# Patient Record
Sex: FEMALE | Race: BLACK OR AFRICAN AMERICAN | NOT HISPANIC OR LATINO | URBAN - METROPOLITAN AREA
[De-identification: names, ages, dates, MRNs, and addresses within clinical notes are randomized per-mention and may not be internally consistent; named-entity substitution may affect disease eponyms.]

---

## 2021-11-02 ENCOUNTER — TRANSCRIBE ORDERS (OUTPATIENT)
Dept: URGENT CARE | Facility: CLINIC | Age: 41
End: 2021-11-02

## 2021-11-02 ENCOUNTER — CLINICAL SUPPORT (OUTPATIENT)
Dept: URGENT CARE | Facility: CLINIC | Age: 41
End: 2021-11-02

## 2021-11-02 DIAGNOSIS — Z02.1 PHYSICAL EXAM, PRE-EMPLOYMENT: Primary | ICD-10-CM

## 2021-11-02 DIAGNOSIS — Z02.1 ENCOUNTER FOR PRE-EMPLOYMENT EXAMINATION: ICD-10-CM

## 2021-11-02 PROCEDURE — 99203 OFFICE O/P NEW LOW 30 MIN: CPT | Performed by: PHYSICIAN ASSISTANT

## 2021-11-02 PROCEDURE — 86787 VARICELLA-ZOSTER ANTIBODY: CPT | Performed by: PHYSICIAN ASSISTANT

## 2021-11-02 PROCEDURE — 86480 TB TEST CELL IMMUN MEASURE: CPT | Performed by: PHYSICIAN ASSISTANT

## 2021-11-02 PROCEDURE — 86762 RUBELLA ANTIBODY: CPT | Performed by: PHYSICIAN ASSISTANT

## 2021-11-02 PROCEDURE — 86735 MUMPS ANTIBODY: CPT | Performed by: PHYSICIAN ASSISTANT

## 2021-11-02 PROCEDURE — 86765 RUBEOLA ANTIBODY: CPT | Performed by: PHYSICIAN ASSISTANT

## 2021-11-03 LAB — RUBV IGG SERPL IA-ACNC: 60 IU/ML

## 2021-11-04 LAB
MEV IGG SER QL: NORMAL
MUV IGG SER QL: NORMAL
VZV IGG SER IA-ACNC: NORMAL

## 2021-11-05 LAB
GAMMA INTERFERON BACKGROUND BLD IA-ACNC: 0.06 IU/ML
M TB IFN-G BLD-IMP: NEGATIVE
M TB IFN-G CD4+ BCKGRND COR BLD-ACNC: -0.01 IU/ML
M TB IFN-G CD4+ BCKGRND COR BLD-ACNC: 0 IU/ML
MITOGEN IGNF BCKGRD COR BLD-ACNC: >10 IU/ML

## 2021-12-16 DIAGNOSIS — B34.9 VIRAL INFECTION: ICD-10-CM

## 2021-12-16 DIAGNOSIS — Z20.822 CLOSE EXPOSURE TO COVID-19 VIRUS: Primary | ICD-10-CM

## 2021-12-16 LAB
SARS-COV-2 AG UPPER RESP QL IA: NEGATIVE
VALID CONTROL: NORMAL

## 2021-12-16 PROCEDURE — 87811 SARS-COV-2 COVID19 W/OPTIC: CPT | Performed by: FAMILY MEDICINE

## 2022-03-15 ENCOUNTER — OFFICE VISIT (OUTPATIENT)
Dept: FAMILY MEDICINE CLINIC | Facility: CLINIC | Age: 42
End: 2022-03-15
Payer: COMMERCIAL

## 2022-03-15 VITALS
RESPIRATION RATE: 18 BRPM | DIASTOLIC BLOOD PRESSURE: 84 MMHG | TEMPERATURE: 98 F | WEIGHT: 160.6 LBS | SYSTOLIC BLOOD PRESSURE: 120 MMHG | HEIGHT: 64 IN | OXYGEN SATURATION: 95 % | HEART RATE: 86 BPM | BODY MASS INDEX: 27.42 KG/M2

## 2022-03-15 DIAGNOSIS — Z13.0 SCREENING, DEFICIENCY ANEMIA, IRON: ICD-10-CM

## 2022-03-15 DIAGNOSIS — Z00.00 HEALTH CARE MAINTENANCE: Primary | ICD-10-CM

## 2022-03-15 DIAGNOSIS — E55.9 VITAMIN D DEFICIENCY: ICD-10-CM

## 2022-03-15 DIAGNOSIS — R53.82 CHRONIC FATIGUE: ICD-10-CM

## 2022-03-15 DIAGNOSIS — Z13.1 SCREENING FOR DIABETES MELLITUS: ICD-10-CM

## 2022-03-15 DIAGNOSIS — Z76.89 ENCOUNTER TO ESTABLISH CARE: ICD-10-CM

## 2022-03-15 DIAGNOSIS — Z13.220 LIPID SCREENING: ICD-10-CM

## 2022-03-15 DIAGNOSIS — Z12.31 ENCOUNTER FOR SCREENING MAMMOGRAM FOR MALIGNANT NEOPLASM OF BREAST: ICD-10-CM

## 2022-03-15 DIAGNOSIS — Z13.29 SCREENING FOR THYROID DISORDER: ICD-10-CM

## 2022-03-15 PROCEDURE — 99386 PREV VISIT NEW AGE 40-64: CPT | Performed by: NURSE PRACTITIONER

## 2022-03-15 RX ORDER — VALACYCLOVIR HYDROCHLORIDE 500 MG/1
500 TABLET, FILM COATED ORAL 2 TIMES DAILY
COMMUNITY

## 2022-03-15 NOTE — PROGRESS NOTES
FAMILY PRACTICE HEALTH MAINTENANCE OFFICE VISIT  [facility] - [facility]    NAME: Tri Chavez  AGE: 35 y o  SEX: female  : 1980     DATE: 3/15/2022    Assessment and Plan     1  Encounter to establish care    2  Encounter for screening mammogram for malignant neoplasm of breast  -     Mammo screening bilateral w 3d & cad; Future; Expected date: 09/15/2022    3  Chronic fatigue  -     CBC and differential; Future  -     Vitamin D 25 hydroxy; Future  -     TSH, 3rd generation with Free T4 reflex; Future  -     Comprehensive metabolic panel; Future    4  Screening, deficiency anemia, iron  -     CBC and differential; Future    5  Screening for diabetes mellitus  -     Comprehensive metabolic panel; Future  -     Hemoglobin A1C; Future    6  Screening for thyroid disorder  -     TSH, 3rd generation with Free T4 reflex; Future    7  Lipid screening  -     Lipid panel; Future    8  Vitamin D deficiency  -     Vitamin D 25 hydroxy; Future        · Patient Counseling:   · Nutrition: Stressed importance of a well balanced diet, moderation of sodium/saturated fat, caloric balance and sufficient intake of fiber  · Exercise: Stressed the importance of regular exercise with a goal of 150 minutes per week  · Dental Health: Discussed daily flossing and brushing and regular dental visits   · Alcohol Use:  Recommended moderation of alcohol intake  · Injury Prevention: Discussed Safety Belts, Safety Helmets, and Smoke Detectors    · Immunizations reviewed: Up To Date  · Discussed benefits of:  Mammogram  and Screening labs   BMI Counseling: Body mass index is 27 57 kg/m²  Discussed with patient's BMI with her  The BMI is above normal  Nutrition recommendations include reducing portion sizes, 3-5 servings of fruits/vegetables daily, moderation in carbohydrate intake and increasing intake of lean protein  Exercise recommendations include exercising 3-5 times per week      No follow-ups on file         Chief Complaint     Chief Complaint   Patient presents with    Establish Care       History of Present Illness     Pt presents to establish care  Reviewed medical and family history    Mom and dad +heart disease, DM  Several siblings have DM    Patient in generally good health  H/o hysterectomy  heavy bleeding, fibroids  +IBS      Well Adult Physical   Patient here for a comprehensive physical exam       Diet and Physical Activity  Diet: well balanced diet  Exercise: occasionally      Depression Screen  PHQ-2/9 Depression Screening    Little interest or pleasure in doing things: 0 - not at all  Feeling down, depressed, or hopeless: 0 - not at all  PHQ-2 Score: 0  PHQ-2 Interpretation: Negative depression screen          General Health  Hearing: Normal:  bilateral  Vision: no vision problems  Dental: regular dental visits    Reproductive Health  No issues       The following portions of the patient's history were reviewed and updated as appropriate: allergies, current medications, past family history, past medical history, past social history, past surgical history and problem list     Review of Systems     Review of Systems   Constitutional: Positive for fatigue  Respiratory: Negative  Cardiovascular: Negative  Gastrointestinal:        IBS with diarrhea, constipation   Neurological: Negative  All other systems reviewed and are negative        Past Medical History     Past Medical History:   Diagnosis Date    Hypertension        Past Surgical History     Past Surgical History:   Procedure Laterality Date     SECTION      HYSTERECTOMY         Social History     Social History     Socioeconomic History    Marital status: Single     Spouse name: None    Number of children: None    Years of education: None    Highest education level: None   Occupational History    None   Tobacco Use    Smoking status: Former Smoker    Smokeless tobacco: Never Used   Substance and Sexual Activity    Alcohol use: Yes    Drug use: Never    Sexual activity: None   Other Topics Concern    None   Social History Narrative    None     Social Determinants of Health     Financial Resource Strain: Not on file   Food Insecurity: Not on file   Transportation Needs: Not on file   Physical Activity: Not on file   Stress: Not on file   Social Connections: Not on file   Intimate Partner Violence: Not on file   Housing Stability: Not on file       Family History     Family History   Problem Relation Age of Onset    Diabetes Mother     Cancer Mother     Hypertension Mother     Diabetes Father     Hypertension Father     Breast cancer Sister     Diabetes Brother     Hypertension Maternal Grandmother     Cancer Maternal Grandfather     Diabetes Paternal Grandmother     Diabetes Paternal Grandfather     Breast cancer Maternal Aunt        Current Medications       Current Outpatient Medications:     valACYclovir (VALTREX) 500 mg tablet, Take 500 mg by mouth 2 (two) times a day, Disp: , Rfl:      Allergies     Allergies   Allergen Reactions    Penicillin G Hives       Objective     /84 (BP Location: Left arm, Patient Position: Sitting, Cuff Size: Large)   Pulse 86   Temp 98 °F (36 7 °C)   Resp 18   Ht 5' 4" (1 626 m)   Wt 72 8 kg (160 lb 9 6 oz)   SpO2 95%   BMI 27 57 kg/m²      Physical Exam  Vitals and nursing note reviewed  Constitutional:       General: She is not in acute distress  Appearance: Normal appearance  She is well-developed  She is not ill-appearing  HENT:      Head: Normocephalic and atraumatic  Right Ear: Tympanic membrane normal       Left Ear: Tympanic membrane normal    Eyes:      General: Lids are normal  Lids are everted, no foreign bodies appreciated  Conjunctiva/sclera: Conjunctivae normal       Pupils: Pupils are equal, round, and reactive to light  Neck:      Thyroid: No thyroid mass or thyromegaly  Vascular: No carotid bruit or JVD  Cardiovascular:      Rate and Rhythm: Normal rate and regular rhythm  Pulses: Normal pulses  Heart sounds: Normal heart sounds  No murmur heard  Pulmonary:      Effort: Pulmonary effort is normal       Breath sounds: Normal breath sounds  Abdominal:      General: Bowel sounds are normal       Palpations: Abdomen is soft  There is no hepatomegaly or splenomegaly  Tenderness: There is no abdominal tenderness  Musculoskeletal:         General: No deformity  Right lower leg: No edema  Left lower leg: No edema  Lymphadenopathy:      Cervical: No cervical adenopathy  Skin:     General: Skin is warm and dry  Coloration: Skin is not pale  Neurological:      General: No focal deficit present  Mental Status: She is alert  Mental status is at baseline     Psychiatric:         Mood and Affect: Mood normal          Behavior: Behavior normal            No exam data present        Taryn Mask, [address]

## 2022-03-26 ENCOUNTER — APPOINTMENT (OUTPATIENT)
Dept: LAB | Facility: HOSPITAL | Age: 42
End: 2022-03-26
Payer: COMMERCIAL

## 2022-03-26 DIAGNOSIS — Z13.220 LIPID SCREENING: ICD-10-CM

## 2022-03-26 DIAGNOSIS — Z13.1 SCREENING FOR DIABETES MELLITUS: ICD-10-CM

## 2022-03-26 DIAGNOSIS — Z13.29 SCREENING FOR THYROID DISORDER: ICD-10-CM

## 2022-03-26 DIAGNOSIS — R53.82 CHRONIC FATIGUE: ICD-10-CM

## 2022-03-26 DIAGNOSIS — E55.9 VITAMIN D DEFICIENCY: ICD-10-CM

## 2022-03-26 DIAGNOSIS — Z13.0 SCREENING, DEFICIENCY ANEMIA, IRON: ICD-10-CM

## 2022-03-26 LAB
25(OH)D3 SERPL-MCNC: 12.1 NG/ML (ref 30–100)
ALBUMIN SERPL BCP-MCNC: 3.9 G/DL (ref 3.5–5)
ALP SERPL-CCNC: 72 U/L (ref 46–116)
ALT SERPL W P-5'-P-CCNC: 59 U/L (ref 12–78)
ANION GAP SERPL CALCULATED.3IONS-SCNC: 12 MMOL/L (ref 4–13)
AST SERPL W P-5'-P-CCNC: 24 U/L (ref 5–45)
BASOPHILS # BLD AUTO: 0.07 THOUSANDS/ΜL (ref 0–0.1)
BASOPHILS NFR BLD AUTO: 1 % (ref 0–1)
BILIRUB SERPL-MCNC: 0.23 MG/DL (ref 0.2–1)
BUN SERPL-MCNC: 16 MG/DL (ref 5–25)
CALCIUM SERPL-MCNC: 8.7 MG/DL (ref 8.3–10.1)
CHLORIDE SERPL-SCNC: 103 MMOL/L (ref 100–108)
CHOLEST SERPL-MCNC: 222 MG/DL
CO2 SERPL-SCNC: 27 MMOL/L (ref 21–32)
CREAT SERPL-MCNC: 0.75 MG/DL (ref 0.6–1.3)
EOSINOPHIL # BLD AUTO: 0.16 THOUSAND/ΜL (ref 0–0.61)
EOSINOPHIL NFR BLD AUTO: 3 % (ref 0–6)
ERYTHROCYTE [DISTWIDTH] IN BLOOD BY AUTOMATED COUNT: 13.9 % (ref 11.6–15.1)
EST. AVERAGE GLUCOSE BLD GHB EST-MCNC: 131 MG/DL
GFR SERPL CREATININE-BSD FRML MDRD: 97 ML/MIN/1.73SQ M
GLUCOSE P FAST SERPL-MCNC: 101 MG/DL (ref 65–99)
HBA1C MFR BLD: 6.2 %
HCT VFR BLD AUTO: 39.6 % (ref 34.8–46.1)
HDLC SERPL-MCNC: 80 MG/DL
HGB BLD-MCNC: 11.7 G/DL (ref 11.5–15.4)
IMM GRANULOCYTES # BLD AUTO: 0.01 THOUSAND/UL (ref 0–0.2)
IMM GRANULOCYTES NFR BLD AUTO: 0 % (ref 0–2)
LDLC SERPL CALC-MCNC: 124 MG/DL (ref 0–100)
LYMPHOCYTES # BLD AUTO: 2.07 THOUSANDS/ΜL (ref 0.6–4.47)
LYMPHOCYTES NFR BLD AUTO: 37 % (ref 14–44)
MCH RBC QN AUTO: 23 PG (ref 26.8–34.3)
MCHC RBC AUTO-ENTMCNC: 29.5 G/DL (ref 31.4–37.4)
MCV RBC AUTO: 78 FL (ref 82–98)
MONOCYTES # BLD AUTO: 0.44 THOUSAND/ΜL (ref 0.17–1.22)
MONOCYTES NFR BLD AUTO: 8 % (ref 4–12)
NEUTROPHILS # BLD AUTO: 2.82 THOUSANDS/ΜL (ref 1.85–7.62)
NEUTS SEG NFR BLD AUTO: 51 % (ref 43–75)
NONHDLC SERPL-MCNC: 142 MG/DL
NRBC BLD AUTO-RTO: 0 /100 WBCS
PLATELET # BLD AUTO: 318 THOUSANDS/UL (ref 149–390)
PMV BLD AUTO: 9.6 FL (ref 8.9–12.7)
POTASSIUM SERPL-SCNC: 3.8 MMOL/L (ref 3.5–5.3)
PROT SERPL-MCNC: 7.6 G/DL (ref 6.4–8.2)
RBC # BLD AUTO: 5.08 MILLION/UL (ref 3.81–5.12)
SODIUM SERPL-SCNC: 142 MMOL/L (ref 136–145)
TRIGL SERPL-MCNC: 92 MG/DL
TSH SERPL DL<=0.05 MIU/L-ACNC: 2.15 UIU/ML (ref 0.36–3.74)
WBC # BLD AUTO: 5.57 THOUSAND/UL (ref 4.31–10.16)

## 2022-03-26 PROCEDURE — 83036 HEMOGLOBIN GLYCOSYLATED A1C: CPT

## 2022-03-26 PROCEDURE — 36415 COLL VENOUS BLD VENIPUNCTURE: CPT

## 2022-03-26 PROCEDURE — 82306 VITAMIN D 25 HYDROXY: CPT

## 2022-03-26 PROCEDURE — 80053 COMPREHEN METABOLIC PANEL: CPT

## 2022-03-26 PROCEDURE — 80061 LIPID PANEL: CPT

## 2022-03-26 PROCEDURE — 85025 COMPLETE CBC W/AUTO DIFF WBC: CPT

## 2022-03-26 PROCEDURE — 84443 ASSAY THYROID STIM HORMONE: CPT

## 2022-04-12 ENCOUNTER — TELEMEDICINE (OUTPATIENT)
Dept: FAMILY MEDICINE CLINIC | Facility: CLINIC | Age: 42
End: 2022-04-12
Payer: COMMERCIAL

## 2022-04-12 DIAGNOSIS — E55.9 VITAMIN D DEFICIENCY: ICD-10-CM

## 2022-04-12 DIAGNOSIS — R53.82 CHRONIC FATIGUE: ICD-10-CM

## 2022-04-12 DIAGNOSIS — R73.03 PRE-DIABETES: Primary | ICD-10-CM

## 2022-04-12 DIAGNOSIS — Z71.2 ENCOUNTER TO DISCUSS TEST RESULTS: ICD-10-CM

## 2022-04-12 PROCEDURE — 99213 OFFICE O/P EST LOW 20 MIN: CPT | Performed by: NURSE PRACTITIONER

## 2022-04-12 RX ORDER — ERGOCALCIFEROL 1.25 MG/1
50000 CAPSULE ORAL WEEKLY
Qty: 12 CAPSULE | Refills: 0 | Status: SHIPPED | OUTPATIENT
Start: 2022-04-12

## 2022-04-12 NOTE — PROGRESS NOTES
Virtual Regular Visit    Verification of patient location:    Patient is located in the following state in which I hold an active license [place]      Assessment/Plan:    Problem List Items Addressed This Visit     None      Visit Diagnoses     Pre-diabetes    -  Primary    Vitamin D deficiency        Chronic fatigue        Encounter to discuss test results          The case discussed with patient using patient centered shared decision making  The patient was counseled regarding instructions for management,-- risk factor reductions,-- prognosis,-- impressions,-- risks and benefits of treatment options,-- importance of compliance with treatment  I have reviewed the instructions with the patient, answering all questions to her satisfaction  Patient opts to make diet changes, cut out iced tea with sugar  Walk more  Recheck A1c and lipids in 5 months    Vit d deficiency may be factor in fatigue  Replace per discussion    rto 6 month f/u         Reason for visit is   Chief Complaint   Patient presents with    Virtual Regular Visit        Encounter provider MIKEY Hemphill    Provider located at [address] [place] [id number]      Recent Visits  No visits were found meeting these conditions  Showing recent visits within past 7 days and meeting all other requirements  Future Appointments  No visits were found meeting these conditions  Showing future appointments within next 150 days and meeting all other requirements       The patient was identified by name and date of birth  Harileny Springer was informed that this is a telemedicine visit and that the visit is being conducted through [address] [place] [address] Now and patient was informed that this is a secure, HIPAA-compliant platform  She agrees to proceed     My office door was closed  No one else was in the room  She acknowledged consent and understanding of privacy and security of the video platform   The patient has agreed to participate and understands they can discontinue the visit at any time  Patient is aware this is a billable service  Subjective        Saw by virtual visit to discuss recent labs  Seen for PE  She c/o feeling very fatigued     Labs reviewed  Has very low d ->12  a1c 6 2  ldl 121  Anemia stable       Past Medical History:   Diagnosis Date    Hypertension        Past Surgical History:   Procedure Laterality Date     SECTION      HYSTERECTOMY         Current Outpatient Medications   Medication Sig Dispense Refill    valACYclovir (VALTREX) 500 mg tablet Take 500 mg by mouth 2 (two) times a day       No current facility-administered medications for this visit  Allergies   Allergen Reactions    Penicillin G Hives       Review of Systems    Video Exam    There were no vitals filed for this visit  Physical Exam     I spent 10 minutes directly with the patient during this visit    VIRTUAL VISIT DISCLAIMER      Jerri Cooks verbally agrees to participate in [employer]  Pt is aware that [employer] could be limited without vital signs or the ability to perform a full hands-on physical [place] understands she or the provider may request at any time to terminate the video visit and request the patient to seek care or treatment in person

## 2022-10-08 ENCOUNTER — HOSPITAL ENCOUNTER (OUTPATIENT)
Dept: RADIOLOGY | Facility: HOSPITAL | Age: 42
Discharge: HOME | End: 2022-10-08
Payer: COMMERCIAL

## 2022-10-08 VITALS — BODY MASS INDEX: 27.31 KG/M2 | HEIGHT: 64 IN | WEIGHT: 160 LBS

## 2022-10-08 DIAGNOSIS — Z12.31 ENCOUNTER FOR SCREENING MAMMOGRAM FOR MALIGNANT NEOPLASM OF BREAST: ICD-10-CM

## 2022-10-08 PROCEDURE — 77067 SCR MAMMO BI INCL CAD: CPT

## 2022-10-08 PROCEDURE — 77063 BREAST TOMOSYNTHESIS BI: CPT

## 2022-11-01 ENCOUNTER — OFFICE VISIT (OUTPATIENT)
Dept: FAMILY MEDICINE CLINIC | Facility: CLINIC | Age: 42
End: 2022-11-01

## 2022-11-01 VITALS
WEIGHT: 167 LBS | RESPIRATION RATE: 18 BRPM | HEIGHT: 64 IN | SYSTOLIC BLOOD PRESSURE: 150 MMHG | OXYGEN SATURATION: 98 % | HEART RATE: 85 BPM | TEMPERATURE: 98 F | DIASTOLIC BLOOD PRESSURE: 92 MMHG | BODY MASS INDEX: 28.51 KG/M2

## 2022-11-01 DIAGNOSIS — M54.50 CHRONIC MIDLINE LOW BACK PAIN WITHOUT SCIATICA: ICD-10-CM

## 2022-11-01 DIAGNOSIS — J06.9 UPPER RESPIRATORY TRACT INFECTION, UNSPECIFIED TYPE: ICD-10-CM

## 2022-11-01 DIAGNOSIS — J03.90 TONSILLITIS: Primary | ICD-10-CM

## 2022-11-01 DIAGNOSIS — G89.29 CHRONIC MIDLINE LOW BACK PAIN WITHOUT SCIATICA: ICD-10-CM

## 2022-11-01 RX ORDER — DOXYCYCLINE HYCLATE 100 MG/1
100 CAPSULE ORAL EVERY 12 HOURS SCHEDULED
Qty: 14 CAPSULE | Refills: 0 | Status: SHIPPED | OUTPATIENT
Start: 2022-11-01 | End: 2022-11-08

## 2022-11-01 RX ORDER — METHYLPREDNISOLONE 4 MG/1
TABLET ORAL
Qty: 21 EACH | Refills: 0 | Status: SHIPPED | OUTPATIENT
Start: 2022-11-01

## 2022-11-01 NOTE — PROGRESS NOTES
Clinic Visit Note  Vivi Epley 38 y o  female   MRN: [id number]    Assessment and Plan      Diagnoses and all orders for this visit:    Tonsillitis  Trial steroid taper to decrease swelling, okay to use ibuprofen, saltwater rinses  -     methylPREDNISolone 4 MG tablet therapy pack; Use as directed on package    Upper respiratory tract infection, unspecified type  If symptoms persistent start doxycycline  -     doxycycline hyclate (VIBRAMYCIN) 100 mg capsule; Take 1 capsule (100 mg total) by mouth every 12 (twelve) hours for 7 days    Chronic midline low back pain without sciatica  Recommend OTC Voltaren gel, conservative treatment including stretching/strengthening exercises  If symptoms persistent x-ray imaging, physical therapy  My impressions and treatment recommendations were discussed in detail with the patient who verbalized understanding and had no further questions  Discharge instructions were provided  Subjective     Chief Complaint:  Same-day visit    History of Present Illness:    Patient is a pleasant 77-year-old female coming in as a same-day visit secondary to possible tonsillar stone, tonsil swelling  The following portions of the patient's history were reviewed and updated as appropriate: allergies, current medications, past family history, past medical history, past social history, past surgical history and problem list     REVIEW OF SYSTEMS:  A complete 12-point review of systems is negative other than that noted in the HPI  Review of Systems   Constitutional: Negative for chills, fatigue and fever  HENT: Positive for congestion and sore throat  Negative for postnasal drip  Respiratory: Negative for cough, shortness of breath and wheezing  Cardiovascular: Negative for chest pain, palpitations and leg swelling  Neurological: Negative for dizziness and headaches           Current Outpatient Medications:   •  doxycycline hyclate (VIBRAMYCIN) 100 mg capsule, Take 1 capsule (100 mg total) by mouth every 12 (twelve) hours for 7 days, Disp: 14 capsule, Rfl: 0  •  methylPREDNISolone 4 MG tablet therapy pack, Use as directed on package, Disp: 21 each, Rfl: 0  Past Medical History:   Diagnosis Date   • BRCA1 negative    • Hypertension      Past Surgical History:   Procedure Laterality Date   • BREAST BIOPSY Right    •  SECTION     • HYSTERECTOMY       Social History     Socioeconomic History   • Marital status: Single     Spouse name: Not on file   • Number of children: Not on file   • Years of education: Not on file   • Highest education level: Not on file   Occupational History   • Not on file   Tobacco Use   • Smoking status: Former Smoker   • Smokeless tobacco: Never Used   Substance and Sexual Activity   • Alcohol use:  Yes   • Drug use: Never   • Sexual activity: Not on file   Other Topics Concern   • Not on file   Social History Narrative   • Not on file     Social Determinants of Health     Financial Resource Strain: Not on file   Food Insecurity: Not on file   Transportation Needs: Not on file   Physical Activity: Not on file   Stress: Not on file   Social Connections: Not on file   Intimate Partner Violence: Not on file   Housing Stability: Not on file     Family History   Problem Relation Age of Onset   • Diabetes Mother    • Cancer Mother    • Hypertension Mother    • Diabetes Father    • Hypertension Father    • Breast cancer Sister    • Diabetes Brother    • Hypertension Maternal Grandmother    • Cancer Maternal Grandfather    • Diabetes Paternal Grandmother    • Diabetes Paternal Grandfather    • Breast cancer Maternal Aunt      Allergies   Allergen Reactions   • Penicillin G Hives       Objective     Vitals:    22 1902   BP: 150/92   BP Location: Left arm   Patient Position: Sitting   Cuff Size: Large   Pulse: 85   Resp: 18   Temp: 98 °F (36 7 °C)   SpO2: 98%   Weight: 75 8 kg (167 lb)   Height: 5' 4" (1 626 m)       Physical Exam:     GENERAL: NAD, pleasant HEENT:  NC/AT, PERRL, EOMI, no scleral icterus, positive pharynx erythema, swollen tonsils bilaterally with some exudate  CARDIAC:  RRR, +S1/S2, no S3/S4 appreciated, no m/g/r  PULMONARY:  CTA B/L, no wheezing/rales/rhonci, non-labored breathing  ABDOMEN:  Soft, NT/ND, no rebound/guarding/rigidity  Extremities:  No edema, cyanosis, or clubbing  Musculoskeletal:  Full range of motion intact in all extremities   NEUROLOGIC: Grossly intact, no focal deficits  SKIN:  No rashes or erythema noted on exposed skin  Psych: Normal affect, mood stable    ==  PLEASE NOTE:  This encounter was completed utilizing the [employer] Direct Speech Voice Recognition Software  Grammatical errors, random word insertions, pronoun errors and incomplete sentences are occasional consequences of the system due to software limitations, ambient noise and hardware issues  These may be missed by proof reading prior to affixing electronic signature  Any questions or concerns about the content, text or information contained within the body of this dictation should be directly addressed to the physician for clarification  Please do not hesitate to call me directly if you have any any questions or concerns      DO Se Torres [address] Internal Medicine   [facility]

## 2022-12-16 ENCOUNTER — OFFICE VISIT (OUTPATIENT)
Dept: FAMILY MEDICINE CLINIC | Facility: CLINIC | Age: 42
End: 2022-12-16

## 2022-12-16 VITALS
RESPIRATION RATE: 14 BRPM | HEIGHT: 64 IN | DIASTOLIC BLOOD PRESSURE: 90 MMHG | HEART RATE: 84 BPM | SYSTOLIC BLOOD PRESSURE: 140 MMHG | WEIGHT: 169 LBS | BODY MASS INDEX: 28.85 KG/M2 | TEMPERATURE: 98 F

## 2022-12-16 DIAGNOSIS — J32.0 CHRONIC MAXILLARY SINUSITIS: ICD-10-CM

## 2022-12-16 DIAGNOSIS — J01.00 ACUTE NON-RECURRENT MAXILLARY SINUSITIS: Primary | ICD-10-CM

## 2022-12-16 DIAGNOSIS — R09.81 SINUS CONGESTION: ICD-10-CM

## 2022-12-16 RX ORDER — DOXYCYCLINE HYCLATE 100 MG/1
100 CAPSULE ORAL EVERY 12 HOURS SCHEDULED
Qty: 14 CAPSULE | Refills: 0 | Status: SHIPPED | OUTPATIENT
Start: 2022-12-16 | End: 2022-12-23

## 2022-12-16 RX ORDER — METHYLPREDNISOLONE 4 MG/1
TABLET ORAL
Qty: 21 EACH | Refills: 0 | Status: SHIPPED | OUTPATIENT
Start: 2022-12-16

## 2022-12-17 NOTE — PROGRESS NOTES
Clinic Visit Note  Luis Manuel Simons 38 y o  female   MRN: [id number]    Assessment and Plan      Diagnoses and all orders for this visit:    Acute non-recurrent maxillary sinusitis  Recommend antibiotic therapy with steroid as this has been very effective in the past   If symptoms do not respond recommend reevaluation in office   -     doxycycline hyclate (VIBRAMYCIN) 100 mg capsule; Take 1 capsule (100 mg total) by mouth every 12 (twelve) hours for 7 days    Sinus congestion  -     methylPREDNISolone 4 MG tablet therapy pack; Use as directed on package    Chronic maxillary sinusitis  Recurrent upper respiratory tract infection symptoms recommend evaluation with ENT to rule out obstruction   -     Ambulatory Referral to Otolaryngology; Future      My impressions and treatment recommendations were discussed in detail with the patient who verbalized understanding and had no further questions  Discharge instructions were provided  Subjective     Chief Complaint: Same-day visit    History of Present Illness:    Patient is a pleasant 43-year-old female coming in with acute respiratory tract infection symptoms consistent with sinus congestion  Patient was previously treated for acute pharyngitis with good relief  The following portions of the patient's history were reviewed and updated as appropriate: allergies, current medications, past family history, past medical history, past social history, past surgical history and problem list     REVIEW OF SYSTEMS:  A complete 12-point review of systems is negative other than that noted in the HPI  Review of Systems   Constitutional: Negative for chills, fatigue and fever  HENT: Positive for congestion, ear pain, sinus pressure, sinus pain and sore throat  Respiratory: Negative for cough, shortness of breath and wheezing  Cardiovascular: Negative for chest pain, palpitations and leg swelling  Neurological: Negative for dizziness and headaches           Current Outpatient Medications:   •  doxycycline hyclate (VIBRAMYCIN) 100 mg capsule, Take 1 capsule (100 mg total) by mouth every 12 (twelve) hours for 7 days, Disp: 14 capsule, Rfl: 0  •  methylPREDNISolone 4 MG tablet therapy pack, Use as directed on package, Disp: 21 each, Rfl: 0  Past Medical History:   Diagnosis Date   • BRCA1 negative    • Hypertension      Past Surgical History:   Procedure Laterality Date   • BREAST BIOPSY Right    •  SECTION     • HYSTERECTOMY       Social History     Socioeconomic History   • Marital status: Single     Spouse name: Not on file   • Number of children: Not on file   • Years of education: Not on file   • Highest education level: Not on file   Occupational History   • Not on file   Tobacco Use   • Smoking status: Former   • Smokeless tobacco: Never   Substance and Sexual Activity   • Alcohol use:  Yes   • Drug use: Never   • Sexual activity: Not on file   Other Topics Concern   • Not on file   Social History Narrative   • Not on file     Social Determinants of Health     Financial Resource Strain: Not on file   Food Insecurity: Not on file   Transportation Needs: Not on file   Physical Activity: Not on file   Stress: Not on file   Social Connections: Not on file   Intimate Partner Violence: Not on file   Housing Stability: Not on file     Family History   Problem Relation Age of Onset   • Diabetes Mother    • Cancer Mother    • Hypertension Mother    • Diabetes Father    • Hypertension Father    • Breast cancer Sister    • Diabetes Brother    • Hypertension Maternal Grandmother    • Cancer Maternal Grandfather    • Diabetes Paternal Grandmother    • Diabetes Paternal Grandfather    • Breast cancer Maternal Aunt      Allergies   Allergen Reactions   • Penicillin G Hives       Objective     Vitals:    22 1552   BP: 140/90   BP Location: Left arm   Patient Position: Sitting   Cuff Size: Standard   Pulse: 84   Resp: 14   Temp: 98 °F (36 7 °C)   TempSrc: Temporal Weight: 76 7 kg (169 lb)   Height: 5' 4" (1 626 m)       Physical Exam:     GENERAL: NAD, pleasant   HEENT:  NC/AT, PERRL, EOMI, no scleral icterus, tympanic membranes bulging especially left eardrum with cloudy fluid  CARDIAC:  RRR, +S1/S2, no S3/S4 appreciated, no m/g/r  PULMONARY:  CTA B/L, no wheezing/rales/rhonci, non-labored breathing  ABDOMEN:  Soft, NT/ND, no rebound/guarding/rigidity  Extremities:  No edema, cyanosis, or clubbing  Musculoskeletal:  Full range of motion intact in all extremities   NEUROLOGIC: Grossly intact, no focal deficits  SKIN:  No rashes or erythema noted on exposed skin  Psych: Normal affect, mood stable    ==  PLEASE NOTE:  This encounter was completed utilizing the [employer] Direct Speech Voice Recognition Software  Grammatical errors, random word insertions, pronoun errors and incomplete sentences are occasional consequences of the system due to software limitations, ambient noise and hardware issues  These may be missed by proof reading prior to affixing electronic signature  Any questions or concerns about the content, text or information contained within the body of this dictation should be directly addressed to the physician for clarification  Please do not hesitate to call me directly if you have any any questions or concerns      DO Se Rubi [address] Internal Medicine   [facility]

## 2023-02-06 ENCOUNTER — OFFICE VISIT (OUTPATIENT)
Dept: OTOLARYNGOLOGY | Facility: CLINIC | Age: 43
End: 2023-02-06

## 2023-02-06 VITALS
SYSTOLIC BLOOD PRESSURE: 128 MMHG | DIASTOLIC BLOOD PRESSURE: 80 MMHG | TEMPERATURE: 97.6 F | HEIGHT: 64 IN | BODY MASS INDEX: 28.85 KG/M2 | WEIGHT: 169 LBS

## 2023-02-06 DIAGNOSIS — H93.A3 PULSATILE TINNITUS OF BOTH EARS: Primary | ICD-10-CM

## 2023-02-06 DIAGNOSIS — H61.23 IMPACTED CERUMEN OF BOTH EARS: ICD-10-CM

## 2023-02-06 NOTE — PROGRESS NOTES
[facility]  [address] Otolaryngology  Otolaryngology -- Head and Neck Surgery New Patient Visit  Ashley Herron is a 38 y  o  who presents with a chief complaint of     Here for pulsating sound in both ears for 2 years, intermittent   Ears:  The patient denied any history of vertigo, ear pain or drainage  There is no history of ear surgeries  No significant family history of hearing loss at young age  Review of systems: Pertinent review of systems documented in the HPI  10 point ROS documented in a separate note, as necessary  Results reviewed; images from any scan have been personally reviewed: The past medical, surgical, social and family history have been reviewed as documented in today's record  Past Medical History:   Diagnosis Date   • BRCA1 negative    • Ear problems    • Headache(784 0)    • Hypertension      Past Surgical History:   Procedure Laterality Date   • BREAST BIOPSY Right    •  SECTION     • HYSTERECTOMY       Family History   Problem Relation Age of Onset   • Diabetes Mother    • Cancer Mother    • Hypertension Mother    • Diabetes Father    • Hypertension Father    • Breast cancer Sister    • Diabetes Brother    • Hypertension Maternal Grandmother    • Cancer Maternal Grandfather    • Diabetes Paternal Grandmother    • Diabetes Paternal Grandfather    • Breast cancer Maternal Aunt      Current Outpatient Medications on File Prior to Visit   Medication Sig Dispense Refill   • [DISCONTINUED] methylPREDNISolone 4 MG tablet therapy pack Use as directed on package 21 each 0     No current facility-administered medications on file prior to visit        Physical exam:   /80 (BP Location: Left arm, Patient Position: Sitting, Cuff Size: Adult)   Temp 97 6 °F (36 4 °C) (Temporal)   Ht 5' 4" (1 626 m)   Wt 76 7 kg (169 lb)   BMI 29 01 kg/m²   Head: Atraumatic, no visible scalp lesions, parotid and submandibular salivary glands non-tender to palpation and without masses bilaterally  Neck:  No visible or palpable cervical lesions or lymphadenopathy, thyroid gland is normal in size and symmetry and without masses, normal laryngeal elevation with swallowing  Ears:    Right ear :  Auricle normal in appearance, mastoid prominence non-tender, external auditory canal clear  Tympanic membranes intact  Impacted cerumen cleaned (look to the procedure notes)  Left ear :  Auricle normal in appearance, mastoid prominence non-tender, external auditory canal clear   Tympanic membranes intact  Impacted cerumen cleaned (look to the procedure notes)  Nose/Sinuses:  External appearance unremarkable, no maxillary or frontal sinus tenderness to palpation bilaterally  Anterior rhinoscopy reveals:   Oral Cavity:  Moist mucus membranes, gums and dentition unremarkable, no oral mucosal masses or lesions, floor of mouth soft, tongue mobile without masses or lesions  Oropharynx:  Base of tongue soft and without masses, tonsils bilaterally unremarkable, soft palate mucosa unremarkable  Eyes:  Extra-ocular movements intact, pupils equally round and reactive to light and accommodation, the lids and conjunctivae are normal in appearance  Constitutional:  Well developed, well nourished and groomed, in no acute distress  Cardiovascular:  Normal rate and rhythm, no palpable thrills, no jugulovenous distension observed  Respiratory:  Normal respiratory effort without evidence of retractions or use of accessory muscles  Neurologic:  Cranial nerves II-XII intact bilaterally  Abdomen: Soft and lax  Extremities: No bruises   Psychiatric:  Alert and oriented to time, place and person  Procedures  Microscopic ear examination with impacted cerumen removal:  Impacted cerumen removed from both ears using different types of instruments including curettes, suction and cup forceps)  Assessment:   1  Pulsatile tinnitus of both ears        2   Impacted cerumen of both ears  Ambulatory Referral to Otolaryngology        Orders  No orders of the defined types were placed in this encounter  Discussion/Plan:      Impacted cerumen  Follow up every 6 months for ear check  Use debrox ear drops once/week      Pulsatile tinnitus  Offered hearing test, MRI and MRI, however  wants to observe

## 2023-03-13 ENCOUNTER — OFFICE VISIT (OUTPATIENT)
Dept: FAMILY MEDICINE CLINIC | Facility: CLINIC | Age: 43
End: 2023-03-13

## 2023-03-13 VITALS
DIASTOLIC BLOOD PRESSURE: 80 MMHG | HEIGHT: 64 IN | RESPIRATION RATE: 14 BRPM | BODY MASS INDEX: 29.37 KG/M2 | OXYGEN SATURATION: 99 % | WEIGHT: 172 LBS | SYSTOLIC BLOOD PRESSURE: 114 MMHG | HEART RATE: 90 BPM | TEMPERATURE: 98 F

## 2023-03-13 DIAGNOSIS — E78.5 HYPERLIPIDEMIA, UNSPECIFIED HYPERLIPIDEMIA TYPE: ICD-10-CM

## 2023-03-13 DIAGNOSIS — Z76.89 ENCOUNTER TO ESTABLISH CARE: ICD-10-CM

## 2023-03-13 DIAGNOSIS — D64.9 ANEMIA, UNSPECIFIED TYPE: ICD-10-CM

## 2023-03-13 DIAGNOSIS — E55.9 VITAMIN D DEFICIENCY: ICD-10-CM

## 2023-03-13 DIAGNOSIS — Z13.29 SCREENING FOR THYROID DISORDER: ICD-10-CM

## 2023-03-13 DIAGNOSIS — Z11.4 SCREENING FOR HIV (HUMAN IMMUNODEFICIENCY VIRUS): ICD-10-CM

## 2023-03-13 DIAGNOSIS — Z11.59 ENCOUNTER FOR HEPATITIS C SCREENING TEST FOR LOW RISK PATIENT: ICD-10-CM

## 2023-03-13 DIAGNOSIS — R73.03 PRE-DIABETES: ICD-10-CM

## 2023-03-13 DIAGNOSIS — H93.A3 PULSATILE TINNITUS OF BOTH EARS: Primary | ICD-10-CM

## 2023-03-13 NOTE — PROGRESS NOTES
Name: Maria Del Carmen Robles      : 1980      MRN: [id number]  Encounter Provider: Iban Tucker MD  Encounter Date: 3/13/2023   Encounter department: [address] [place] [address]     1  Pulsatile tinnitus of both ears  Assessment & Plan:  She was seen by ENT last month for this  Offered MRI and hearing test, but at the time just wanted to observe  Since then has been getting a lot worse  She will follow up with ENT to have MRI and hearing test ordered  2  Hyperlipidemia, unspecified hyperlipidemia type  -     Comprehensive metabolic panel; Future  -     Lipid Panel with Direct LDL reflex; Future    3  Pre-diabetes  -     HEMOGLOBIN A1C W/ EAG ESTIMATION; Future    4  Vitamin D deficiency  -     Vitamin D 25 hydroxy; Future    5  Screening for thyroid disorder  -     TSH, 3rd generation with Free T4 reflex; Future    6  Anemia, unspecified type  -     CBC and differential; Future    7  Encounter for hepatitis C screening test for low risk patient  -     Hepatitis C antibody; Future    8  Screening for HIV (human immunodeficiency virus)  -     HIV 1/2 AG/AB W REFLEX [facility] and [facility] only; Future    9  Encounter to establish care           Mata JEFF   Candace Martins is a 39 yo female with a history of hyperlipidemia, prediabetes, vitamin D def who presents today as a new patient to establish care  She has been having pulsatile tinnitus for a couple of years  Gradually worsening over the past 2 weeks  Was seen for this by ENT, but at the time it wasn't as bad so chose to observe  Review of Systems   Constitutional: Negative  HENT: Positive for tinnitus  Eyes: Negative  Respiratory: Negative  Cardiovascular: Negative  Gastrointestinal: Negative  Endocrine: Negative  Genitourinary: Negative  Musculoskeletal: Negative  Skin: Negative  Allergic/Immunologic: Negative  Neurological: Negative  Hematological: Negative  Psychiatric/Behavioral: Negative  No current outpatient medications on file prior to visit  Objective     /80   Pulse 90   Temp 98 °F (36 7 °C)   Resp 14   Ht 5' 4" (1 626 m)   Wt 78 kg (172 lb)   SpO2 99%   BMI 29 52 kg/m²     Physical Exam  Constitutional:       General: She is not in acute distress  Appearance: She is well-developed  She is not diaphoretic  HENT:      Head: Normocephalic and atraumatic  Right Ear: Tympanic membrane, ear canal and external ear normal  There is no impacted cerumen  Left Ear: Tympanic membrane, ear canal and external ear normal  There is no impacted cerumen  Nose: Nose normal  No congestion or rhinorrhea  Mouth/Throat:      Mouth: Mucous membranes are moist       Pharynx: Oropharynx is clear  No oropharyngeal exudate or posterior oropharyngeal erythema  Eyes:      General: No scleral icterus  Right eye: No discharge  Left eye: No discharge  Conjunctiva/sclera: Conjunctivae normal    Cardiovascular:      Rate and Rhythm: Normal rate and regular rhythm  Heart sounds: Normal heart sounds  No murmur heard  No friction rub  No gallop  Pulmonary:      Effort: Pulmonary effort is normal  No respiratory distress  Breath sounds: Normal breath sounds  No wheezing or rales  Chest:      Chest wall: No tenderness  Musculoskeletal:         General: No deformity  Normal range of motion  Cervical back: Normal range of motion and neck supple  Skin:     General: Skin is warm and dry  Neurological:      Mental Status: She is alert and oriented to person, place, and time  Psychiatric:         Behavior: Behavior normal          Thought Content:  Thought content normal          Judgment: Judgment normal        Yamila Solis MD

## 2023-03-13 NOTE — ASSESSMENT & PLAN NOTE
She was seen by ENT last month for this  Offered MRI and hearing test, but at the time just wanted to observe  Since then has been getting a lot worse  She will follow up with ENT to have MRI and hearing test ordered

## 2023-03-29 ENCOUNTER — OFFICE VISIT (OUTPATIENT)
Dept: OTOLARYNGOLOGY | Facility: CLINIC | Age: 43
End: 2023-03-29

## 2023-03-29 ENCOUNTER — OFFICE VISIT (OUTPATIENT)
Dept: AUDIOLOGY | Facility: CLINIC | Age: 43
End: 2023-03-29

## 2023-03-29 VITALS
BODY MASS INDEX: 29.37 KG/M2 | WEIGHT: 172 LBS | TEMPERATURE: 97.6 F | SYSTOLIC BLOOD PRESSURE: 128 MMHG | DIASTOLIC BLOOD PRESSURE: 84 MMHG | HEIGHT: 64 IN

## 2023-03-29 DIAGNOSIS — H69.83 ETD (EUSTACHIAN TUBE DYSFUNCTION), BILATERAL: ICD-10-CM

## 2023-03-29 DIAGNOSIS — M54.2 NECK PAIN: ICD-10-CM

## 2023-03-29 DIAGNOSIS — Z01.10 NORMAL BEHAVIORAL AUDIOMETRY: Primary | ICD-10-CM

## 2023-03-29 DIAGNOSIS — E55.9 VITAMIN D DEFICIENCY: ICD-10-CM

## 2023-03-29 DIAGNOSIS — H93.A3 PULSATILE TINNITUS OF BOTH EARS: Primary | ICD-10-CM

## 2023-03-29 PROBLEM — H69.93 ETD (EUSTACHIAN TUBE DYSFUNCTION), BILATERAL: Status: ACTIVE | Noted: 2023-03-29

## 2023-03-29 RX ORDER — FLUTICASONE PROPIONATE 50 MCG
1 SPRAY, SUSPENSION (ML) NASAL 2 TIMES DAILY
Qty: 1 G | Refills: 6 | Status: SHIPPED | OUTPATIENT
Start: 2023-03-29

## 2023-03-29 NOTE — PROGRESS NOTES
Assessment/Plan:    Pulsatile tinnitus of both ears  Pulsatile tinnitus for past two years  Cerumen removal over a month ago  Reviewed nature of pulsatile tinnitus and possible causes including viral illness, cardiovascular, intracranial HTN, thyroid disorder, musculoskeletal, and pregnancy (history hysterectomy)  Audiogram today indicating normal bilateral hearing, tymps type A bilaterally  Reassured no SNHL, no CHL, no otitis media  Options for treatment include lab studies, medication options, CTA study, and eye exam   Reviewed ophthalmology exam in detail including to rule out papillary edema  Discussed risk of intracranial HTN due to retinal changes and pulsatile tinnitus  Possible neuro surgeon evaluation depending on CTA results  Discussed option of treating for ETD with nasal steroids and decongestants  May consider PT for musculoskeletal concerns as well  Pt agreed to Fluticasone nasal spray one spray each nostril twice per day, Warm compresses to area during increased pulsing, massage to area, and labs  She is also considering PT to address and musculoskeletal source of pulsatile tinnitus  Follow up in about 6 weeks to re-evaluate  Consider imaging at that time if no improvement in symptoms            Diagnoses and all orders for this visit:    Pulsatile tinnitus of both ears  -     Ambulatory referral to Audiology  -     RPR-Syphilis Screening (Total Syphilis IGG/IGM); Future  -     PTH, intact; Future  -     RF Screen w/ Reflex to Titer; Future  -     C-reactive protein; Future  -     Sedimentation rate, automated; Future  -     T4; Future  -     SHYANN w/Reflex; Future  -     T3, uptake; Future  -     Ambulatory Referral to Physical Therapy; Future  -     Ambulatory referral to Ophthalmology; Future  -     fluticasone (FLONASE) 50 mcg/act nasal spray; 1 spray into each nostril 2 (two) times a day    Vitamin D deficiency  -     PTH, intact;  Future    ETD (Eustachian tube "dysfunction), bilateral  -     fluticasone (FLONASE) 50 mcg/act nasal spray; 1 spray into each nostril 2 (two) times a day    Neck pain          Subjective:      Patient ID: Chalo Villeda is a 38 y o  female  Presents today as a follow up due to ear concerns  Occurring for about 2 years  Bilateral pulsatile tinnitus  Heartbeat in ears, more in left vs right  No otalgia or otorrhea  No history of ear surgery  No current hearing aids  The following portions of the patient's history were reviewed and updated as appropriate: allergies, current medications, past family history, past medical history, past social history, past surgical history and problem list     Review of Systems   Constitutional: Negative  HENT: Positive for tinnitus  Negative for congestion, ear discharge, ear pain, hearing loss, nosebleeds, postnasal drip, rhinorrhea, sinus pressure, sinus pain, sore throat and voice change  Eyes: Negative  Respiratory: Negative for chest tightness and shortness of breath  Cardiovascular: Negative  Gastrointestinal: Negative  Endocrine: Negative  Musculoskeletal: Negative  Skin: Negative for color change  Neurological: Negative for dizziness, numbness and headaches  Psychiatric/Behavioral: Negative  Objective:      /84 (BP Location: Left arm, Patient Position: Sitting, Cuff Size: Adult)   Temp 97 6 °F (36 4 °C) (Temporal)   Ht 5' 4\" (1 626 m)   Wt 78 kg (172 lb)   BMI 29 52 kg/m²          Physical Exam  Constitutional:       Appearance: She is well-developed  HENT:      Head: Normocephalic  Right Ear: Hearing, tympanic membrane, ear canal and external ear normal  No decreased hearing noted  No drainage or tenderness  Tympanic membrane is not perforated or erythematous  Left Ear: Hearing, tympanic membrane, ear canal and external ear normal  No decreased hearing noted  No drainage or tenderness   Tympanic membrane is not perforated or " erythematous  Nose: Nose normal  No nasal deformity or septal deviation  Mouth/Throat:      Mouth: Mucous membranes are not pale and not dry  No oral lesions  Dentition: Normal dentition  Pharynx: Uvula midline  No oropharyngeal exudate  Neck:      Trachea: No tracheal deviation  Cardiovascular:      Rate and Rhythm: Normal rate  Pulmonary:      Effort: Pulmonary effort is normal  No accessory muscle usage or respiratory distress  Musculoskeletal:      Cervical back: Full passive range of motion without pain, normal range of motion and neck supple  Lymphadenopathy:      Cervical: No cervical adenopathy  Skin:     General: Skin is warm and dry  Neurological:      Mental Status: She is alert and oriented to person, place, and time  Cranial Nerves: No cranial nerve deficit  Sensory: No sensory deficit  Psychiatric:         Behavior: Behavior is cooperative

## 2023-03-29 NOTE — PROGRESS NOTES
ADULT ENT HEARING EVALUATION - [place] [address] AUDIOLOGY      Patient Name: Neda Vieyra   MRN:  [id number]   :  1980   Age: 38 y o  Gender: female   DOS: 3/29/2023     HISTORY:     Neda Vieyra, a 38 y o  female, was seen on 3/29/2023 at the referral of MIKEY Clarke,  for an evaluation of hearing as part of her ENT visit  Ms Tamara Leyva primary complaint is tinnitus  She denied otalgia, otorrhea, aural fullness and dizziness  Ms Hetal Pereira has not had her hearing tested previously  RESULTS:    Otoscopic Evaluation:   Right Ear: Unremarkable, canal clear   Left Ear: Unremarkable, canal clear    Tympanometry:   Right Ear: Type As, normal middle ear pressure with decreased static compliance, consistent with a hypomobile tympanic membrane  Left Ear: Type A; normal middle ear pressure and static compliance     Audiometry:  Conventional pure tone audiometry from 250 - 8000 Hz  obtained with good reliability and revealed the following:     Right Ear: normal hearing sensitivity   Left Ear: normal hearing sensitivity     Speech Audiometry:    Speech Reception (SRT)   Right Ear: 10 dB HL   Left Ear: 10 dB HL    Word Recognition Scores (WRS):  Right Ear: excellent (100 % correct)     Left Ear: excellent (100 % correct)   Stimuli: [id number]    *see attached audiogram*      RECOMMENDATIONS:    1 ) Follow-up with referring provider  2 ) Audiologic re-evaluation as needed  3 ) Use masking noises for tinnitus when it becomes bothersome  It was a pleasure working with Ms Hetal Pereira today  Thank you for referring this patient       Ave Olivares , CCC-A  Clinical Audiologist    [address] [place] [id number]

## 2023-03-29 NOTE — PATIENT INSTRUCTIONS
Discussed ETD, TMJ/neck discomfort, vs ear processes  No significant findings on exam today       TMJ syndrome - soft diet, jaw rest, NSAIDs, warm compresses, massage, physical therapy, oral appliance by dentist for   ETD - May use Flonase one to two times per day  Ophthalmology consultation  Consider imaging next visit if no improvement

## 2023-03-29 NOTE — ASSESSMENT & PLAN NOTE
Pulsatile tinnitus for past two years  Cerumen removal over a month ago  Reviewed nature of pulsatile tinnitus and possible causes including viral illness, cardiovascular, intracranial HTN, thyroid disorder, musculoskeletal, and pregnancy (history hysterectomy)  Audiogram today indicating normal bilateral hearing, tymps type A bilaterally  Reassured no SNHL, no CHL, no otitis media  Options for treatment include lab studies, medication options, CTA study, and eye exam   Reviewed ophthalmology exam in detail including to rule out papillary edema  Discussed risk of intracranial HTN due to retinal changes and pulsatile tinnitus  Possible neuro surgeon evaluation depending on CTA results  Discussed option of treating for ETD with nasal steroids and decongestants  May consider PT for musculoskeletal concerns as well  Pt agreed to Fluticasone nasal spray one spray each nostril twice per day, Warm compresses to area during increased pulsing, massage to area, and labs  She is also considering PT to address and musculoskeletal source of pulsatile tinnitus  Follow up in about 6 weeks to re-evaluate    Consider imaging at that time if no improvement in symptoms

## 2023-04-19 ENCOUNTER — APPOINTMENT (OUTPATIENT)
Dept: PHYSICAL THERAPY | Facility: CLINIC | Age: 43
End: 2023-04-19

## 2023-04-26 ENCOUNTER — OFFICE VISIT (OUTPATIENT)
Dept: FAMILY MEDICINE CLINIC | Facility: CLINIC | Age: 43
End: 2023-04-26

## 2023-04-26 VITALS
TEMPERATURE: 97.6 F | RESPIRATION RATE: 16 BRPM | SYSTOLIC BLOOD PRESSURE: 138 MMHG | OXYGEN SATURATION: 99 % | WEIGHT: 170 LBS | DIASTOLIC BLOOD PRESSURE: 80 MMHG | HEIGHT: 64 IN | BODY MASS INDEX: 29.02 KG/M2 | HEART RATE: 110 BPM

## 2023-04-26 DIAGNOSIS — Z00.00 WELL ADULT EXAM: Primary | ICD-10-CM

## 2023-04-26 DIAGNOSIS — R73.03 PRE-DIABETES: ICD-10-CM

## 2023-04-26 DIAGNOSIS — E55.9 VITAMIN D DEFICIENCY: ICD-10-CM

## 2023-04-26 DIAGNOSIS — E78.5 HYPERLIPIDEMIA, UNSPECIFIED HYPERLIPIDEMIA TYPE: ICD-10-CM

## 2023-04-26 RX ORDER — ERGOCALCIFEROL 1.25 MG/1
50000 CAPSULE ORAL WEEKLY
Qty: 12 CAPSULE | Refills: 0 | Status: SHIPPED | OUTPATIENT
Start: 2023-04-26

## 2023-04-26 NOTE — ASSESSMENT & PLAN NOTE
Counseled on diet/exercise     Lab Results   Component Value Date    CHOLESTEROL 224 (H) 04/08/2023    CHOLESTEROL 222 (H) 03/26/2022     Lab Results   Component Value Date    HDL 64 04/08/2023    HDL 80 03/26/2022     Lab Results   Component Value Date    TRIG 107 04/08/2023    TRIG 92 03/26/2022     Lab Results   Component Value Date    [place] 142 03/26/2022

## 2023-04-26 NOTE — PROGRESS NOTES
FAMILY PRACTICE HEALTH MAINTENANCE OFFICE VISIT  [facility]    NAME: Debora Mai  AGE: 38 y o  SEX: female  : 1980     DATE: 2023    Assessment and Plan     1  Well adult exam    2  Vitamin D deficiency  -     ergocalciferol (VITAMIN D2) 50,000 units; Take 1 capsule (50,000 Units total) by mouth once a week  -     Vitamin D 25 hydroxy; Future; Expected date: 2023    3  Hyperlipidemia, unspecified hyperlipidemia type  Assessment & Plan:  Counseled on diet/exercise  Lab Results   Component Value Date    CHOLESTEROL 224 (H) 2023    CHOLESTEROL 222 (H) 2022     Lab Results   Component Value Date    HDL 64 2023    HDL 80 2022     Lab Results   Component Value Date    TRIG 107 2023    TRIG 92 2022     Lab Results   Component Value Date    [place] 142 2022       Orders:  -     Lipid Panel with Direct LDL reflex; Future; Expected date: 2023  -     Comprehensive metabolic panel; Future; Expected date: 2023    4  Pre-diabetes  Assessment & Plan:  Counseled on diet/exercise  A1c elevated at 6 4  Orders:  -     HEMOGLOBIN A1C W/ EAG ESTIMATION; Future; Expected date: 2023        Patient Counseling:   Nutrition: Stressed importance of a well balanced diet, moderation of sodium/saturated fat, caloric balance and sufficient intake of fiber  Exercise: Stressed the importance of regular exercise with a goal of 150 minutes per week  Dental Health: Discussed daily flossing and brushing and regular dental visits   Immunizations reviewed: Up To Date  Discussed benefits of:  Cervical Cancer screening  BMI Counseling: Body mass index is 29 18 kg/m²  Discussed with patient's BMI with her   The BMI is above normal  Nutrition recommendations include reducing portion sizes, decreasing overall calorie intake, 3-5 servings of fruits/vegetables daily, reducing fast food intake, consuming healthier snacks and decreasing soda and/or juice intake  Exercise recommendations include moderate aerobic physical activity for 150 minutes/week  No follow-ups on file  Chief Complaint     Chief Complaint   Patient presents with   • Physical Exam     Kat Chen MA        History of Present Illness     HPI    Well Adult Physical   Patient here for a comprehensive physical exam       Diet and Physical Activity  Diet: well balanced diet  Exercise: never      Depression Screen  PHQ-2/9 Depression Screening            General Health  Hearing: Normal:  bilateral  Vision: no vision problems  Dental: regular dental visits, brushes teeth twice daily and flosses teeth occasionally    Reproductive Health  No issues  and s/p total hysterectomy      The following portions of the patient's history were reviewed and updated as appropriate: allergies, current medications, past family history, past medical history, past social history, past surgical history and problem list     Review of Systems     Review of Systems   Constitutional: Negative  HENT: Negative  Eyes: Negative  Respiratory: Negative  Cardiovascular: Negative  Gastrointestinal: Negative  Endocrine: Negative  Genitourinary: Negative  Musculoskeletal: Negative  Skin: Negative  Allergic/Immunologic: Negative  Neurological: Negative  Hematological: Negative  Psychiatric/Behavioral: Negative          Past Medical History     Past Medical History:   Diagnosis Date   • BRCA1 negative    • Ear problems    • Headache(784 0)    • Hypertension        Past Surgical History     Past Surgical History:   Procedure Laterality Date   • BREAST BIOPSY Right    •  SECTION     • HYSTERECTOMY         Social History     Social History     Socioeconomic History   • Marital status: Single     Spouse name: None   • Number of children: None   • Years of education: None   • Highest education level: None   Occupational History   • None   Tobacco Use "  • Smoking status: Never   • Smokeless tobacco: Never   Vaping Use   • Vaping Use: Never used   Substance and Sexual Activity   • Alcohol use: Not Currently   • Drug use: Never   • Sexual activity: Not Currently     Partners: Male     Birth control/protection: Abstinence, Post-menopausal   Other Topics Concern   • None   Social History Narrative   • None     Social Determinants of Health     Financial Resource Strain: Not on file   Food Insecurity: Not on file   Transportation Needs: Not on file   Physical Activity: Not on file   Stress: Not on file   Social Connections: Not on file   Intimate Partner Violence: Not on file   Housing Stability: Not on file       Family History     Family History   Problem Relation Age of Onset   • Diabetes Mother    • Cancer Mother    • Hypertension Mother    • Diabetes Father    • Hypertension Father    • Breast cancer Sister    • Diabetes Brother    • Hypertension Maternal Grandmother    • Cancer Maternal Grandfather    • Diabetes Paternal Grandmother    • Diabetes Paternal Grandfather    • Breast cancer Maternal Aunt        Current Medications       Current Outpatient Medications:   •  ergocalciferol (VITAMIN D2) 50,000 units, Take 1 capsule (50,000 Units total) by mouth once a week, Disp: 12 capsule, Rfl: 0  •  fluticasone (FLONASE) 50 mcg/act nasal spray, 1 spray into each nostril 2 (two) times a day, Disp: 1 g, Rfl: 6     Allergies     Allergies   Allergen Reactions   • Penicillin G Hives       Objective     /80   Pulse (!) 110   Temp 97 6 °F (36 4 °C)   Resp 16   Ht 5' 4\" (1 626 m)   Wt 77 1 kg (170 lb)   SpO2 99%   BMI 29 18 kg/m²      Physical Exam  Constitutional:       General: She is not in acute distress  Appearance: Normal appearance  She is well-developed  She is not diaphoretic  HENT:      Head: Normocephalic and atraumatic  Right Ear: Tympanic membrane, ear canal and external ear normal  There is no impacted cerumen        Left Ear: Tympanic " membrane, ear canal and external ear normal  There is no impacted cerumen  Eyes:      General: No scleral icterus  Right eye: No discharge  Left eye: No discharge  Extraocular Movements: Extraocular movements intact  Conjunctiva/sclera: Conjunctivae normal       Pupils: Pupils are equal, round, and reactive to light  Cardiovascular:      Rate and Rhythm: Normal rate and regular rhythm  Heart sounds: Normal heart sounds  No murmur heard  No friction rub  No gallop  Pulmonary:      Effort: Pulmonary effort is normal  No respiratory distress  Breath sounds: Normal breath sounds  No wheezing or rales  Chest:      Chest wall: No tenderness  Abdominal:      General: Bowel sounds are normal  There is no distension  Palpations: Abdomen is soft  There is no mass  Tenderness: There is no abdominal tenderness  There is no guarding or rebound  Musculoskeletal:         General: No deformity  Normal range of motion  Cervical back: Normal range of motion and neck supple  Skin:     General: Skin is warm and dry  Findings: No erythema or rash  Neurological:      Mental Status: She is alert and oriented to person, place, and time  Psychiatric:         Behavior: Behavior normal          Thought Content:  Thought content normal          Judgment: Judgment normal            Vision Screening    Right eye Left eye Both eyes   Without correction 20/30 20/20 20/20   With correction              MD CIELO ZhaoKANSAS DEPT  [facility]

## 2023-05-01 ENCOUNTER — OFFICE VISIT (OUTPATIENT)
Dept: PHYSICAL THERAPY | Facility: CLINIC | Age: 43
End: 2023-05-01

## 2023-05-01 DIAGNOSIS — H93.A3 PULSATILE TINNITUS OF BOTH EARS: Primary | ICD-10-CM

## 2023-05-01 DIAGNOSIS — M54.2 NECK PAIN: ICD-10-CM

## 2023-05-01 NOTE — PROGRESS NOTES
Daily Note     Today's date: 2023  Patient name: Gilbert Kuhn  : 1980  MRN: [id number]  Referring provider: Grady Halsted, CRNP  Dx:   Encounter Diagnosis     ICD-10-CM    1  Pulsatile tinnitus of both ears  H93  A3       2  Neck pain  M54 2           Start Time: 1750  Stop Time: 2352  Total time in clinic (min): 30 minutes    Subjective: Patient reports no change in frequency of headaches or pulsating sensation in her head  Objective: See treatment diary below      Assessment: Tolerated treatment well  Assessment of thoracic spine mobility performed, with hypomobile segments in upper to middle thoracic region  Patient also notes pain with palpation, that was abolished with HVLA  Patient consented to HVLA performed of mid thoracic spine today  Post HVLA, patient notes abolished headache and tinnitus in B ears  This remained abolished for the remainder of the session  Patient provided with corrective exercise to help reinforce thoracic mobility, as mechanical changes were documented today  Patient verbalized understanding to all above information and updated HEP  Patient would benefit from continued PT      Plan: Continue per plan of care  Precautions: standard     Past Medical History:   Diagnosis Date    BRCA1 negative     Ear problems     Headache(784 0)     Hypertension        Insurance:  AMA/CMS Eval/ Re-eval POC expires [place] #/ Referral # Total   Visits  Start date  Expiration date Extension  Visit limitation? PT only or  PT+OT?  Co-Insurance   [employer] 4/17 7/10  No auth required     [facility] PT $15 Co-pay                                                                 Date 2023       Visit Number IE 2                Manual         Cervical distraction  Performed        Cervical PIIVMs  Performed        HVLA thoracic gapping T3-T4  Performed with patient consent       SOR   Performed       B UT, SCM stretch  Performed                                   Neuro Re-ed Repeated cervical retraction In supine x10        Scap retract         Seated thoracic extension repeated  10                                  Thera Ex         UT stretch          Levator stretch                                                                Thera Activity         Patient education x5' x5'        Lifting mechanics         Posture education x5'        Sleep posture x3'                                             Modalities         Anthony Ruby Peg Delmar

## 2023-05-02 ENCOUNTER — NEW PATIENT COMPREHENSIVE (OUTPATIENT)
Dept: URBAN - METROPOLITAN AREA CLINIC 6 | Facility: CLINIC | Age: 43
End: 2023-05-02

## 2023-05-02 DIAGNOSIS — R51.9: ICD-10-CM

## 2023-05-02 DIAGNOSIS — H93.13: ICD-10-CM

## 2023-05-02 PROCEDURE — 92083 EXTENDED VISUAL FIELD XM: CPT

## 2023-05-02 PROCEDURE — 92004 COMPRE OPH EXAM NEW PT 1/>: CPT

## 2023-05-02 PROCEDURE — 92133 CPTRZD OPH DX IMG PST SGM ON: CPT

## 2023-05-02 ASSESSMENT — TONOMETRY
OS_IOP_MMHG: 18
OD_IOP_MMHG: 20

## 2023-05-02 ASSESSMENT — VISUAL ACUITY
OS_SC: 20/25+1
OD_SC: 20/20-1

## 2023-05-07 ENCOUNTER — TELEPHONE (OUTPATIENT)
Dept: OTHER | Facility: OTHER | Age: 43
End: 2023-05-07

## 2023-05-07 NOTE — TELEPHONE ENCOUNTER
Pt will not be coming to appointment tomorrow  @ 7:30 am     Please give her a call back to reschedule

## 2023-05-15 ENCOUNTER — OFFICE VISIT (OUTPATIENT)
Dept: OTOLARYNGOLOGY | Facility: CLINIC | Age: 43
End: 2023-05-15

## 2023-05-15 VITALS — HEIGHT: 64 IN | BODY MASS INDEX: 29.19 KG/M2 | WEIGHT: 171 LBS | TEMPERATURE: 97.5 F

## 2023-05-15 DIAGNOSIS — H93.A3 PULSATILE TINNITUS OF BOTH EARS: Primary | ICD-10-CM

## 2023-05-15 DIAGNOSIS — M54.2 NECK PAIN: ICD-10-CM

## 2023-05-15 DIAGNOSIS — H69.83 ETD (EUSTACHIAN TUBE DYSFUNCTION), BILATERAL: ICD-10-CM

## 2023-05-15 DIAGNOSIS — R51.9 FREQUENT HEADACHES: ICD-10-CM

## 2023-05-15 NOTE — PROGRESS NOTES
Assessment/Plan:    Pulsatile tinnitus of both ears  Pulsatile tinnitus for past two years  Cerumen removal over two month ago  Reviewed nature of pulsatile tinnitus and possible causes including viral illness, cardiovascular, intracranial HTN, thyroid disorder, musculoskeletal, and pregnancy (history hysterectomy)  Family history pertinent for aunt with brain aneurysm, Crest syndrome       Audiogram last visit indicating normal bilateral hearing, tymps type A bilaterally  Reassured no SNHL, no CHL, no otitis media  Reviewed recent evaluation with ophthalmology negative for papillary edema  Reviewed recent labs indicating Vitamin D deficiency  Discussed symptoms associated with vitamin D deficiency including muscle aches, fatigue  Reviewed current dosing, repeat lab in 3 months, then OTC once level within normal range       Options for treatment include medication options, CTA/MRA study, and eye exam   Discussed risk of intracranial HTN due to retinal changes and pulsatile tinnitus  Possible neuro surgeon evaluation depending on imaging results  Discussed option of treating for ETD with nasal steroids and decongestants  May consider PT for musculoskeletal concerns as well but pt found this ineffective  Given consult for neurology due to frequent headaches on top of head         Pt agreed to Fluticasone nasal spray one spray each nostril daily, Warm compresses to area during increased pulsing, massage to area, and labs  She wishes to proceed with imaging at this time and ordered MRA head and neck to rule out intracranial hypertension     Follow up depending on test results       Diagnoses and all orders for this visit:    Pulsatile tinnitus of both ears  -     Ambulatory referral to Neurology; Future  -     MRI brain w wo mra head wo mra neck w wo; Future    ETD (Eustachian tube dysfunction), bilateral    Neck pain  -     MRI brain w wo mra head wo mra neck w wo;  Future    Frequent headaches  - "   Ambulatory referral to Neurology; Future  -     MRI brain w wo mra head wo mra neck w wo; Future          Subjective:      Patient ID: Gustavo Blanchard is a 38 y o  female  Presents today as a follow up due to ear concerns  Occurring for about 2 years  Bilateral pulsatile tinnitus  Heartbeat in ears, more in left vs right  No otalgia or otorrhea  No history of ear surgery  No current hearing aids  Random headaches, hurts behind right eye, feels from top head to side to of head  Since last visit, Tried PT and felt no significant change  Sensation would temporarily subside but always would return  Family history of brain aneurysm, Crest syndrome  The following portions of the patient's history were reviewed and updated as appropriate: allergies, current medications, past family history, past medical history, past social history, past surgical history and problem list     Review of Systems   Constitutional: Negative  HENT: Positive for tinnitus  Negative for congestion, ear discharge, ear pain, hearing loss, nosebleeds, postnasal drip, rhinorrhea, sinus pressure, sinus pain, sore throat and voice change  Respiratory: Negative for chest tightness and shortness of breath  Musculoskeletal: Positive for neck pain  Skin: Negative for color change  Neurological: Negative for dizziness, numbness and headaches  Psychiatric/Behavioral: Negative  Objective:      Temp 97 5 °F (36 4 °C) (Temporal)   Ht 5' 4\" (1 626 m)   Wt 77 6 kg (171 lb)   BMI 29 35 kg/m²          Physical Exam  Constitutional:       Appearance: She is well-developed  HENT:      Head: Normocephalic  Right Ear: Hearing, tympanic membrane, ear canal and external ear normal  No decreased hearing noted  No drainage or tenderness  Tympanic membrane is not perforated or erythematous  Left Ear: Hearing, tympanic membrane, ear canal and external ear normal  No decreased hearing noted   No drainage or " tenderness  Tympanic membrane is not perforated or erythematous  Nose: Nose normal  No nasal deformity or septal deviation  Mouth/Throat:      Mouth: Mucous membranes are not pale and not dry  No oral lesions  Dentition: Normal dentition  Pharynx: Uvula midline  No oropharyngeal exudate  Neck:      Trachea: No tracheal deviation  Pulmonary:      Effort: Pulmonary effort is normal  No accessory muscle usage or respiratory distress  Musculoskeletal:      Cervical back: Full passive range of motion without pain and neck supple  Lymphadenopathy:      Cervical: No cervical adenopathy  Skin:     General: Skin is warm and dry  Neurological:      Mental Status: She is alert and oriented to person, place, and time  Cranial Nerves: No cranial nerve deficit  Sensory: No sensory deficit  Psychiatric:         Behavior: Behavior is cooperative

## 2023-05-15 NOTE — PATIENT INSTRUCTIONS
Once complete 3 month of prescription vitamin D then start over the counter vitamin D3 - 2,000 to 4,000 units per day    Repeat level every 6 months to one year    Resume Flonase one spray each nostril daily

## 2023-05-15 NOTE — ASSESSMENT & PLAN NOTE
Pulsatile tinnitus for past two years  Cerumen removal over two month ago  Reviewed nature of pulsatile tinnitus and possible causes including viral illness, cardiovascular, intracranial HTN, thyroid disorder, musculoskeletal, and pregnancy (history hysterectomy)  Family history pertinent for aunt with brain aneurysm, Crest syndrome       Audiogram last visit indicating normal bilateral hearing, tymps type A bilaterally  Reassured no SNHL, no CHL, no otitis media  Reviewed recent evaluation with ophthalmology negative for papillary edema  Reviewed recent labs indicating Vitamin D deficiency  Discussed symptoms associated with vitamin D deficiency including muscle aches, fatigue  Reviewed current dosing, repeat lab in 3 months, then OTC once level within normal range       Options for treatment include medication options, CTA/MRA study, and eye exam   Discussed risk of intracranial HTN due to retinal changes and pulsatile tinnitus  Possible neuro surgeon evaluation depending on imaging results  Discussed option of treating for ETD with nasal steroids and decongestants  May consider PT for musculoskeletal concerns as well but pt found this ineffective  Given consult for neurology due to frequent headaches on top of head         Pt agreed to Fluticasone nasal spray one spray each nostril daily, Warm compresses to area during increased pulsing, massage to area, and labs    She wishes to proceed with imaging at this time and ordered MRA head and neck to rule out intracranial hypertension     Follow up depending on test results

## 2023-06-10 ENCOUNTER — HOSPITAL ENCOUNTER (OUTPATIENT)
Dept: MRI IMAGING | Facility: HOSPITAL | Age: 43
Discharge: HOME | End: 2023-06-10
Payer: COMMERCIAL

## 2023-06-10 DIAGNOSIS — M54.2 NECK PAIN: ICD-10-CM

## 2023-06-10 DIAGNOSIS — R51.9 FREQUENT HEADACHES: ICD-10-CM

## 2023-06-10 DIAGNOSIS — H93.A3 PULSATILE TINNITUS OF BOTH EARS: ICD-10-CM

## 2023-06-10 PROCEDURE — G1004 CDSM NDSC: HCPCS

## 2023-06-10 PROCEDURE — A9585 GADOBUTROL INJECTION: HCPCS | Performed by: NURSE PRACTITIONER

## 2023-06-10 PROCEDURE — 70553 MRI BRAIN STEM W/O & W/DYE: CPT

## 2023-06-10 PROCEDURE — 70544 MR ANGIOGRAPHY HEAD W/O DYE: CPT

## 2023-06-10 PROCEDURE — 70549 MR ANGIOGRAPH NECK W/O&W/DYE: CPT

## 2023-06-10 RX ADMIN — GADOBUTROL 7 ML: 604.72 INJECTION INTRAVENOUS at 15:42

## 2023-06-14 DIAGNOSIS — H93.A3 PULSATILE TINNITUS OF BOTH EARS: Primary | ICD-10-CM

## 2023-06-14 DIAGNOSIS — G93.2 INTRACRANIAL HYPERTENSION: ICD-10-CM

## 2023-06-14 DIAGNOSIS — R90.89 ABNORMAL FINDING ON MRI OF BRAIN: ICD-10-CM

## 2023-06-16 ENCOUNTER — APPOINTMENT (OUTPATIENT)
Dept: LAB | Facility: CLINIC | Age: 43
End: 2023-06-16

## 2023-06-16 DIAGNOSIS — Z20.1 EXPOSURE TO TB: Primary | ICD-10-CM

## 2023-06-16 DIAGNOSIS — Z20.1 EXPOSURE TO TB: ICD-10-CM

## 2023-06-16 PROCEDURE — 36415 COLL VENOUS BLD VENIPUNCTURE: CPT

## 2023-06-16 PROCEDURE — 86480 TB TEST CELL IMMUN MEASURE: CPT

## 2023-06-20 LAB
GAMMA INTERFERON BACKGROUND BLD IA-ACNC: 0.06 IU/ML
M TB IFN-G BLD-IMP: NEGATIVE
M TB IFN-G CD4+ BCKGRND COR BLD-ACNC: -0.01 IU/ML
M TB IFN-G CD4+ BCKGRND COR BLD-ACNC: -0.02 IU/ML
MITOGEN IGNF BCKGRD COR BLD-ACNC: >10 IU/ML

## 2023-06-20 NOTE — ASSESSMENT & PLAN NOTE
New evaluation of possible intracranial hypertension  · Noted on work up by ENT for pulsatile tinnitus     Imaging:  · MRI brain 6/10/23: 1  No acute finding  No mass or pathologic enhancement  2   Redundant left anterior inferior cerebellar artery loops around the left 7th/8th nerve complex in the proximal left IAC suggesting neurovascular conflict and may be the cause of patient's pulsatile tinnitus  3   Partially empty sella and mildly prominent bilateral optic nerve sheath CSF spaces suggesting intracranial hypertension  Correlate with clinical assessment and ophthalmologic exam   · MRA carotids 6/10/23: Unremarkable MR angiogram of the cervical vasculature  · MRA head 6/10/23: Unremarkable MR angiogram of the brain    Plan:  · Ophthalmology to evaluate for papilledema    · Neurology to consider LP to r/o IIH

## 2023-06-21 ENCOUNTER — OFFICE VISIT (OUTPATIENT)
Dept: NEUROSURGERY | Facility: CLINIC | Age: 43
End: 2023-06-21
Payer: COMMERCIAL

## 2023-06-21 VITALS
OXYGEN SATURATION: 99 % | BODY MASS INDEX: 28.87 KG/M2 | HEART RATE: 74 BPM | DIASTOLIC BLOOD PRESSURE: 86 MMHG | TEMPERATURE: 98.2 F | WEIGHT: 169.1 LBS | HEIGHT: 64 IN | SYSTOLIC BLOOD PRESSURE: 132 MMHG | RESPIRATION RATE: 18 BRPM

## 2023-06-21 DIAGNOSIS — R90.89 ABNORMAL FINDING ON MRI OF BRAIN: ICD-10-CM

## 2023-06-21 DIAGNOSIS — G93.2 INTRACRANIAL HYPERTENSION: ICD-10-CM

## 2023-06-21 DIAGNOSIS — H93.A3 PULSATILE TINNITUS OF BOTH EARS: Primary | ICD-10-CM

## 2023-06-21 PROCEDURE — 99243 OFF/OP CNSLTJ NEW/EST LOW 30: CPT | Performed by: PHYSICIAN ASSISTANT

## 2023-06-21 RX ORDER — SODIUM CHLORIDE 9 MG/ML
75 INJECTION, SOLUTION INTRAVENOUS CONTINUOUS
OUTPATIENT
Start: 2023-06-21

## 2023-06-21 NOTE — ASSESSMENT & PLAN NOTE
New evaluation of pulsatile tinnitus   · Noted 2 years ago, resolved spontaneously then recurred recently  Both ears are affected  Also with floaters and headaches  Work up started by ENT  · Possible intracranial hypertension suspected on MRI  · Exam: self correcting dysmetria on the left, otherwise non-focal    Imaging:  · MRI brain 6/10/23: 1  No acute finding  No mass or pathologic enhancement  2   Redundant left anterior inferior cerebellar artery loops around the left 7th/8th nerve complex in the proximal left IAC suggesting neurovascular conflict and may be the cause of patient's pulsatile tinnitus  3   Partially empty sella and mildly prominent bilateral optic nerve sheath CSF spaces suggesting intracranial hypertension  Correlate with clinical assessment and ophthalmologic exam   · MRA carotids 6/10/23: Unremarkable MR angiogram of the cervical vasculature  · MRA head 6/10/23: Unremarkable MR angiogram of the brain    Plan:  · Reviewed images with patient and Dr David Monge  No obvious aneurysm or malformation, no suggestion thus far on imaging for dural AV fistula  · Option of angiogram for further evaluation of possible dural AV fistula as well as venous sinuses discussed  She would like to proceed with this  Risks and benefits discussed with patient, and informed consent obtained by Dr David Monge  · Redundant AICA on the left not likely causing bilateral pulsatile tinnitus, with no recommendation for surgical intervention especially in the absence of facial pain  · Neurology referral placed to consider LP to r/o IIH given partial empty sella and headaches with floaters  · OR  to reach out to patient to schedule angiogram   · Follow-up after procedure to discuss results  Call sooner with any questions or concerns

## 2023-06-21 NOTE — PROGRESS NOTES
Neurosurgery Office Note  Charie Floor 38 y o  female MRN: [id number]      Assessment/Plan     Pulsatile tinnitus of both ears  New evaluation of pulsatile tinnitus   · Noted 2 years ago, resolved spontaneously then recurred recently  Both ears are affected  Also with floaters and headaches  Work up started by ENT  · Possible intracranial hypertension suspected on MRI  · Exam: self correcting dysmetria on the left, otherwise non-focal    Imaging:  · MRI brain 6/10/23: 1  No acute finding  No mass or pathologic enhancement  2   Redundant left anterior inferior cerebellar artery loops around the left 7th/8th nerve complex in the proximal left IAC suggesting neurovascular conflict and may be the cause of patient's pulsatile tinnitus  3   Partially empty sella and mildly prominent bilateral optic nerve sheath CSF spaces suggesting intracranial hypertension  Correlate with clinical assessment and ophthalmologic exam   · MRA carotids 6/10/23: Unremarkable MR angiogram of the cervical vasculature  · MRA head 6/10/23: Unremarkable MR angiogram of the brain    Plan:  · Reviewed images with patient and Dr Opal Urias  No obvious aneurysm or malformation, no suggestion thus far on imaging for dural AV fistula  · Option of angiogram for further evaluation of possible dural AV fistula as well as venous sinuses discussed  She would like to proceed with this  Risks and benefits discussed with patient, and informed consent obtained by Dr Opal Urias  · Redundant AICA on the left not likely causing bilateral pulsatile tinnitus, with no recommendation for surgical intervention especially in the absence of facial pain  · Neurology referral placed to consider LP to r/o IIH given partial empty sella and headaches with floaters  · OR  to reach out to patient to schedule angiogram   · Follow-up after procedure to discuss results  Call sooner with any questions or concerns         Diagnoses and all orders for this visit:    Pulsatile tinnitus of both ears  -     Ambulatory referral to Neurosurgery  -     IR cerebral angiography; Future    Abnormal finding on MRI of brain  -     Ambulatory referral to Neurosurgery  -     IR cerebral angiography; Future    Intracranial hypertension  -     Ambulatory referral to Neurosurgery  -     IR cerebral angiography; Future  -     Ambulatory referral to Neurology; Future    Other orders  -     Diet NPO; Sips with meds; Standing  -     Nursing communication Apply gown prior to procedure; Standing  -     Have Patient Void On Call to Procedure Room; Standing  -     Insert and Maintain IV; Standing  -     sodium chloride 0 9 % infusion          I have spent a total time of 35 minutes on 06/21/23 in caring for this patient including Diagnostic results, Impressions, Counseling / Coordination of care, Documenting in the medical record, Reviewing / ordering tests, medicine, procedures  , Obtaining or reviewing history   and Communicating with other healthcare professionals   CHIEF COMPLAINT    Chief Complaint   Patient presents with   • Consult       HISTORY    History of Present Illness     38y o  year old female     47-year-old female seen for new evaluation of possible intracranial hypertension with redundant left AICA loops around the left 7th/8th nerve complex and partial empty sella  States that a couple years ago she had a pulsing sound in both ears  Unsure what caused this to happen, but it resolved on its own  In the last several months, the pulsing sound has returned and is worse  States that it comes and goes  States that she has had floaters which are unchanged for years  Recent ophthalmologic exam was negative for any acute abnormality, no specific mention of papilledema  She is noting headaches a couple times during the week  There is a focal spot on the top of her head as well as in the frontal area bilaterally and occasionally behind the right eye  No facial pain  Denies blurry or double vision  Denies having her headache be positional   States that she oftentimes wakes up with it, it is worse with coughing, bending over, bearing down  No gait issues, but has always been a bit clumsy  No BBI  See Discussion    REVIEW OF SYSTEMS    Review of Systems   Constitutional: Negative  HENT: Positive for tinnitus (pulsating sound in both ears)  Eyes: Positive for pain (ocassional pain in right eye with headaches) and visual disturbance (floaters in both eyes for a few years now)  Respiratory: Negative  Cardiovascular: Negative  Gastrointestinal: Negative  Endocrine: Negative  Genitourinary: Negative  Musculoskeletal: Positive for gait problem (at times feels unbalanced no recent falls)  Allergic/Immunologic: Negative  Neurological: Positive for headaches (a few times a week right side of head right eye or both eyes describes pain as pressure lasts all day hard to get rid of)  Negative for dizziness, tremors, seizures, syncope, speech difficulty, weakness, light-headedness and numbness  Hematological: Negative  Does not bruise/bleed easily  Psychiatric/Behavioral: Positive for sleep disturbance (not a good sleeper)  Negative for confusion  ROS obtained by MA  Reviewed  See HPI  Meds/Allergies     Current Outpatient Medications   Medication Sig Dispense Refill   • ergocalciferol (VITAMIN D2) 50,000 units Take 1 capsule (50,000 Units total) by mouth once a week 12 capsule 0   • fluticasone (FLONASE) 50 mcg/act nasal spray 1 spray into each nostril 2 (two) times a day (Patient not taking: Reported on 6/21/2023) 1 g 6     No current facility-administered medications for this visit         Allergies   Allergen Reactions   • Penicillin G Hives       PAST HISTORY    Past Medical History:   Diagnosis Date   • BRCA1 negative    • Ear problems    • Headache(784 0)    • Hypertension        Past Surgical History:   Procedure Laterality Date   • "BREAST BIOPSY Right 2010   •  SECTION     • HYSTERECTOMY         Social History     Tobacco Use   • Smoking status: Never   • Smokeless tobacco: Never   Vaping Use   • Vaping Use: Never used   Substance Use Topics   • Alcohol use: Not Currently   • Drug use: Never       Family History   Problem Relation Age of Onset   • Diabetes Mother    • Cancer Mother    • Hypertension Mother    • Diabetes Father    • Hypertension Father    • Breast cancer Sister    • Diabetes Brother    • Hypertension Maternal Grandmother    • Cancer Maternal Grandfather    • Diabetes Paternal Grandmother    • Diabetes Paternal Grandfather    • Breast cancer Maternal Aunt          Above history personally reviewed  EXAM    Vitals:Blood pressure 132/86, pulse 74, temperature 98 2 °F (36 8 °C), resp  rate 18, height 5' 4\" (1 626 m), weight 76 7 kg (169 lb 1 6 oz), SpO2 99 %  ,Body mass index is 29 03 kg/m²  Physical Exam  Vitals reviewed  Constitutional:       General: She is awake  Appearance: Normal appearance  HENT:      Head: Normocephalic and atraumatic  Eyes:      Extraocular Movements: EOM normal       Conjunctiva/sclera: Conjunctivae normal       Pupils: Pupils are equal, round, and reactive to light  Cardiovascular:      Rate and Rhythm: Normal rate  Pulmonary:      Effort: Pulmonary effort is normal    Skin:     General: Skin is warm and dry  Neurological:      Mental Status: She is alert and oriented to person, place, and time  Motor: Motor strength is normal      Coordination: Finger-nose-finger test: dysmetric but self-correcting on left  Gait: Gait is intact  Deep Tendon Reflexes:      Reflex Scores:       Bicep reflexes are 2+ on the right side and 2+ on the left side  Brachioradialis reflexes are 2+ on the right side and 2+ on the left side  Patellar reflexes are 2+ on the right side and 2+ on the left side         Achilles reflexes are 2+ on the right side and 2+ on the " left side  Psychiatric:         Attention and Perception: Attention and perception normal          Mood and Affect: Mood and affect normal          Speech: Speech normal          Behavior: Behavior normal  Behavior is cooperative  Thought Content: Thought content normal          Cognition and Memory: Cognition and memory normal          Judgment: Judgment normal          Neurologic Exam     Mental Status   Oriented to person, place, and time  Follows 2 step commands  Attention: normal  Concentration: normal    Speech: speech is normal   Level of consciousness: alert  Knowledge: good  Able to perform simple calculations  Able to name object  Able to repeat  Normal comprehension  Cranial Nerves     CN III, IV, VI   Pupils are equal, round, and reactive to light  Extraocular motions are normal    Right pupil: Shape: regular  Reactivity: brisk  Consensual response: intact  Left pupil: Shape: regular  Reactivity: brisk  Consensual response: intact  CN III: no CN III palsy  CN VI: no CN VI palsy  Nystagmus: none   Ophthalmoparesis: none  Upgaze: normal  Downgaze: normal  Conjugate gaze: present    CN V   Facial sensation intact  CN VII   Facial expression full, symmetric  CN VIII   Hearing: intact    CN XI   Right trapezius strength: normal  Left trapezius strength: normal    CN XII   CN XII normal      Motor Exam   Muscle bulk: normal  Overall muscle tone: normal  Right arm pronator drift: absent  Left arm pronator drift: absent    Strength   Strength 5/5 throughout  Sensory Exam   Light touch normal      Gait, Coordination, and Reflexes     Gait  Gait: normal    Coordination   Finger-nose-finger test: dysmetric but self-correcting on left      Tremor   Resting tremor: absent  Intention tremor: absent  Action tremor: absent    Reflexes   Right brachioradialis: 2+  Left brachioradialis: 2+  Right biceps: 2+  Left biceps: 2+  Right patellar: 2+  Left patellar: 2+  Right achilles: 2+  Left achilles: 2+  Right Malone: absent  Left Malone: absent  Right ankle clonus: absent  Left ankle clonus: absent        MEDICAL DECISION MAKING    Imaging Studies:     MRI brain w wo contrast    Result Date: 6/14/2023  Narrative: MRI BRAIN WITH AND WITHOUT CONTRAST INDICATION: H93  A3: Pulsatile tinnitus, bilateral M54 2: Cervicalgia R51 9: Headache, unspecified  COMPARISON:  None  TECHNIQUE: Multiplanar, multisequence imaging of the brain was performed before and after gadolinium administration  IV Contrast:  7 mL of Gadobutrol injection (SINGLE-DOSE) IMAGE QUALITY:   Diagnostic  FINDINGS: BRAIN PARENCHYMA:  There is no discrete mass, mass effect or midline shift  There is no intracranial hemorrhage  Normal posterior fossa  Diffusion imaging is unremarkable  There are no white matter changes in the cerebral hemispheres  Postcontrast imaging of the brain demonstrates no abnormal enhancement  The redundant left anterior inferior cerebellar artery loops around the left 7th/8th nerve complex in the proximal left IAC (series 1601 images [id number]) VENTRICLES:  Normal for the patient's age  SELLA AND PITUITARY GLAND: Partially empty sella  ORBITS: Mildly prominent bilateral optic nerve sheath CSF spaces  PARANASAL SINUSES:  Normal  VASCULATURE:  Evaluation of the major intracranial vasculature demonstrates appropriate flow voids  CALVARIUM AND SKULL BASE:  Normal  EXTRACRANIAL SOFT TISSUES:  Normal      Impression: 1  No acute finding  No mass or pathologic enhancement  2   Redundant left anterior inferior cerebellar artery loops around the left 7th/8th nerve complex in the proximal left IAC suggesting neurovascular conflict and may be the cause of patient's pulsatile tinnitus  3   Partially empty sella and mildly prominent bilateral optic nerve sheath CSF spaces suggesting intracranial hypertension   Correlate with clinical assessment and ophthalmologic exam  Workstation performed: [id number]     MRA carotids wo and w contrast    Result Date: 6/14/2023  Narrative: MRA NECK - WITH AND WITHOUT CONTRAST INDICATION: H93  A3: Pulsatile tinnitus, bilateral M54 2: Cervicalgia R51 9: Headache, unspecified COMPARISON:  None  TECHNIQUE:  Gadolinium enhanced and noncontrast examination with 3-D reconstruction  IV Contrast:  7 mL of Gadobutrol injection (SINGLE-DOSE) FINDINGS: IMAGE QUALITY:  Diagnostic  AORTIC ARCH:  Normal  VISUALIZED SUBCLAVIAN ARTERIES:  The subclavian arteries demonstrate normal enhancement with no stenosis  VERTEBRAL ARTERIES:  Normal vertebral artery origins  The vertebral arteries are bilaterally symmetric with normal enhancement and no evidence of stenosis  Normal vertebral basilar junction  RIGHT CAROTID ARTERY: Normal common carotid artery, cervical internal carotid artery and external carotid artery  No stenosis at the bifurcation  LEFT CAROTID ARTERY:  Normal common carotid artery, cervical internal carotid artery and external carotid artery  No stenosis at the bifurcation  VISUALIZED INTRACRANIAL VASCULATURE:  Limited visualization of the intracranial vasculature is grossly unremarkable  NASCET criteria was used to determine the degree of internal carotid artery diameter stenosis  Impression: Unremarkable MR angiogram of the cervical vasculature  Workstation performed: [id number]     MRA head wo contrast    Result Date: 6/14/2023  Narrative: MRA BRAIN WITHOUT CONTRAST INDICATION:  H93  A3: Pulsatile tinnitus, bilateral M54 2: Cervicalgia R51 9: Headache, unspecified COMPARISON:  None  TECHNIQUE:  Axial 3-D time-of-flight imaging with 3-D reconstructions performed without contrast  IV Contrast:  Not administered  FINDINGS: IMAGE QUALITY:  Diagnostic  ANATOMY INTERNAL CAROTID ARTERIES:  Normal flow related signal of the distal cervical, petrous and cavernous segments of the internal carotid arteries  Normal ICA terminus  ANTERIOR CIRCULATION:  Normal A1 segments  Normal variant ABI trifurcation   Normal anterior communicating artery  Normal flow-related signal of the anterior cerebral arteries  MIDDLE CEREBRAL ARTERY CIRCULATION:  The M1 segment and middle cerebral artery branches demonstrate normal flow-related signal  DISTAL VERTEBRAL ARTERIES:  Distal vertebral arteries are patent with a normal vertebrobasilar junction  The posterior inferior cerebellar artery origins are normal  BASILAR ARTERY:  Normal  POSTERIOR CEREBRAL ARTERIES:  Both posterior cerebral arteries arise from the internal carotid arteries consistent with a fetal origin  No focal stenosis is identified  Patent posterior communicating arteries  Impression: Unremarkable MR angiogram of the brain Workstation performed: [id number]       I have personally reviewed pertinent reports     and I have personally reviewed pertinent films in PACS

## 2023-06-22 ENCOUNTER — TELEPHONE (OUTPATIENT)
Dept: NEUROLOGY | Facility: CLINIC | Age: 43
End: 2023-06-22

## 2023-06-23 ENCOUNTER — TELEPHONE (OUTPATIENT)
Dept: NEUROLOGY | Facility: CLINIC | Age: 43
End: 2023-06-23

## 2023-06-23 DIAGNOSIS — R90.89 ABNORMAL FINDING ON MRI OF BRAIN: ICD-10-CM

## 2023-06-23 DIAGNOSIS — G93.2 INTRACRANIAL HYPERTENSION: ICD-10-CM

## 2023-06-23 DIAGNOSIS — H93.A3 PULSATILE TINNITUS OF BOTH EARS: Primary | ICD-10-CM

## 2023-06-23 NOTE — TELEPHONE ENCOUNTER
Called patient and left a VM for her to call back to schedule an appointment with Dr Jason Harris on 6-29-23 at 9 am     If patient calls back I have slot on hold for her

## 2023-06-29 ENCOUNTER — CONSULT (OUTPATIENT)
Dept: NEUROLOGY | Facility: CLINIC | Age: 43
End: 2023-06-29
Payer: COMMERCIAL

## 2023-06-29 VITALS
OXYGEN SATURATION: 99 % | WEIGHT: 167.3 LBS | TEMPERATURE: 98.1 F | DIASTOLIC BLOOD PRESSURE: 78 MMHG | HEIGHT: 64 IN | BODY MASS INDEX: 28.56 KG/M2 | HEART RATE: 79 BPM | SYSTOLIC BLOOD PRESSURE: 124 MMHG

## 2023-06-29 DIAGNOSIS — R29.818 SUSPECTED SLEEP APNEA: ICD-10-CM

## 2023-06-29 DIAGNOSIS — G43.009 MIGRAINE WITHOUT AURA AND WITHOUT STATUS MIGRAINOSUS, NOT INTRACTABLE: Primary | ICD-10-CM

## 2023-06-29 DIAGNOSIS — H93.19 TINNITUS: ICD-10-CM

## 2023-06-29 PROCEDURE — 99244 OFF/OP CNSLTJ NEW/EST MOD 40: CPT | Performed by: STUDENT IN AN ORGANIZED HEALTH CARE EDUCATION/TRAINING PROGRAM

## 2023-06-29 RX ORDER — RIZATRIPTAN BENZOATE 10 MG/1
10 TABLET ORAL AS NEEDED
Qty: 10 TABLET | Refills: 6 | Status: SHIPPED | OUTPATIENT
Start: 2023-06-29

## 2023-06-29 RX ORDER — TOPIRAMATE 25 MG/1
TABLET ORAL
Qty: 120 TABLET | Refills: 4 | Status: SHIPPED | OUTPATIENT
Start: 2023-06-29

## 2023-06-29 NOTE — PROGRESS NOTES
[facility] [facility] Consult  PATIENT:  Davie Elizabeth  MRN:  [id number]  :  1980  DATE OF SERVICE:  2023  REFERRED BY: Deborah Saavedra PA-C  PMD: Buckley Goldmann, MD    Assessment/Plan:     Davie Elizabeth is a delightful 38 y o  female with a past medical history that includes hyperlipidemia, prediabetes referred here for evaluation of headache  Initial evaluation 2023     Ms Lino Ayala reports a longstanding history of headaches since she was about 26years old  She recently started to experience pulsatile tinnitus in her ears and was seen by neurosurgery due to MRI findings  She is pending a conventional angiogram to rule out any vascular abnormality that could be contributing to her tinnitus  She was referred to the neurology clinic out of concern for possible idiopathic intracranial hypertension  I believe the MRI findings were incidental   Clinically, she does not have any symptoms consistent with IIH  She denies any transient visual obscurations and there is no positional component to her headache  Nonetheless, I think it will be beneficial to try and treat her headaches with Topamax because if there is a slight component of intracranial hypertension, this would help address it  It may also help with weight loss which would be beneficial for her  From an abortive standpoint, I will have her try rizatriptan  I believe that there may be a component of sleep apnea contributing to her symptoms and have recommended that she also get screened with a sleep study  It is reassuring that she recently had a normal ophthalmologic exam without any evidence of papilledema, but I have asked her to keep me updated should she start to experience any changes in her vision  If there are continued concerns for elevated intracranial pressure in the future, we can pursue a lumbar puncture at that time      Migraine without aura and without status migrainosus, not intractable  -     rizatriptan (Maxalt) 10 mg tablet; Take 1 tablet (10 mg total) by mouth as needed for migraine Take at the onset of migraine; if symptoms continue or return, may take another dose at least 2 hours after first dose  Take no more than 2 doses in a day  -     topiramate (TOPAMAX) 25 mg tablet; 1 tab PO QHS for 1 week, increase as tolerated to 1 tab BID for 1 week, then 1 tab QAM and 2 tabs QHS for 1 week and finish at 2 tabs BID  Tinnitus  Suspected sleep apnea  -     Home Study; Future        Workup:  - Neurologic assessment reveals unremarkable neurological exam  - MRI brain with and without contrast 6/10/2023: No acute findings  No postcontrast enhancement  Redundant left AICA loops around the left 7th/8th nerve complex  Partially empty sella and mildly prominent bilateral optic nerve sheath CSF spaces  - MRA head 6/10/2023: Unremarkable imaging    No aneurysm  - MRA carotids with and without contrast 6/10/2023: Unremarkable cervical vasculature  - Sleep study    Preventative:  - we discussed headache hygiene and lifestyle factors that may improve headaches  - Topamax with goal 50mg BID  - Currently on through other providers: None  - Past/ failed/contraindicated: None  - future options: TCA/SNRI, CGRP med, botox    Acute:  - discussed not taking over-the-counter or prescription pain medications more than 3 days per week to prevent medication overuse/rebound headache  - Rizatriptan 10mg  - Currently on through other providers: None  - Past/ failed/contraindicated: Imitrex (helped, but felt funny with it)  - future options:  Triptan, prochlorperazine, Toradol IM or p o , could consider trial of 5 days of Depakote 500 mg nightly or dexamethasone 2 mg daily for prolonged migraine, marilyn Rasmussen nurtec  Patient instructions   Additional Testing:   Home study    Headache Calendar  Please maintain a headache calendar  Consider using phone applications such as Migraine [facility] or [facility] Migraine Tracker    Headache/migraine treatment:   Acute medications (for immediate treatment of a headache): It is ok to take ibuprofen, acetaminophen or naproxen (Advil, Tylenol,  Aleve, Excedrin) if they help your headaches you should limit these to No more than 2-3 times a week to avoid medication overuse/rebound headaches  For your more moderate to severe migraines take this medication early  Maxalt (rizatriptan) 10mg tabs - take one at the onset of headache  May repeat one time after 2 hours if pain has not resolved  (Max 2 a day and 10 a month)     Prescription preventive medications for headaches/migraines   (to take every day to help prevent headaches - not to take at the time of headache): Topiramate  25 mg nightly for 1 week, then increase to 25 mg in a m  And 25 mg in p m  For 1 week, then take 25 mg in a m  And 50 mg in p m  For 1 week, then take 50 mg in a m  and 50 mg in p m  And continue  - generally the common side effects improve as your body gets used to the medication  If we need to spread out a more gradual increase of the medication on a longer scale we can, just call if any questions or concerns  - if necessary, if the a m  dose is causing side effects we can always have you take the full dose at night instead    - important to know per data, this medication may, but typically does not affect birth control unless you are taking 200 mg daily or more and I highly recommend being on birth control while on this medication due to possible significant detrimental effects to fetus if you were to get pregnant     The medication you are taking may impact pregnancy  It has been associated with birth defects and learning problems if taken during pregnancy  Thus, it is important to avoid unplanned pregnancy while taking this medication  In the future, if you plan to become pregnant, then you should discuss this with your neurologist since medication adjustments may be indicated      *Typically these types of medications take time until you see the benefit, although some may see improvement in days, often it may take weeks, especially if the medication is being titrated up to a beneficial level  Please contact us if there are any concerns or questions regarding the medication  Lifestyle Recommendations:  [x] SLEEP - Maintain a regular sleep schedule: Adults need at least 7-8 hours of uninterrupted a night  Maintain good sleep hygiene:  Going to bed and waking up at consistent times, avoiding excessive daytime naps, avoiding caffeinated beverages in the evening, avoid excessive stimulation in the evening and generally using bed primarily for sleeping  One hour before bedtime would recommend turning lights down lower, decreasing your activity (may read quietly, listen to music at a low volume)  When you get into bed, should eliminate all technology (no texting, emailing, playing with your phone, iPad or tablet in bed)  [x] HYDRATION - Maintain good hydration  Drink  2L of fluid a day (4 typical small water bottles)  [x] DIET - Maintain good nutrition  In particular don't skip meals and try and eat healthy balanced meals regularly  [x] TRIGGERS - Look for other triggers and avoid them: Limit caffeine to 1-2 cups a day or less  Avoid dietary triggers that you have noticed bring on your headaches (this could include aged cheese, peanuts, MSG, aspartame and nitrates)  [x] EXERCISE - physical exercise as we all know is good for you in many ways, and not only is good for your heart, but also is beneficial for your mental health, cognitive health and  chronic pain/headaches  I would encourage at the least 5 days of physical exercise weekly for at least 30 minutes  Education and Follow-up  [x] Please call with any questions or concerns  Of course if any new concerning symptoms go to the emergency department  [x] Follow up in 6 months  CC:    We had the pleasure of evaluating Joel Carranza in neurological consultation today  Elza Rinne is a   right handed female who presents today for evaluation of headaches  History obtained from patient as well as available medical record review  History of Present Illness:   Current medical illnesses  or past medical history include hyperlipidemia, prediabetes    Pertinent history:  -Patient was seen by neurosurgery clinic on 6/21/2023 for pulsatile tinnitus  She underwent imaging that did not demonstrate any obvious aneurysm, malformation, or dural AV fistula  She opted for conventional angiogram to rule this out  Headaches started at what age? 26years old  How often do the headaches occur?   - as of 6/29/2023: 15/30 (of those, about 8 can be more severe)  What time of the day do the headaches start? Morning  How long do the headaches last? About 4-6 hours or longer  Are you ever headache free? Yes    Aura? without aura     Where is your headache located and pain quality? Frontal and biparietal; pain behind the right eye; throbbing and pressure  What is the intensity of pain? Worst 8/10, Average: 5/10  Associated symptoms:   [x] Photophobia  [x] Prefer quiet, dark room  [x] Tinnitus - describes it as pulsating    - No TVO's    Things that make the headache worse? Bending over; bearing down on the toilet   - No positional component    Headache triggers:  None    Have you seen someone else for headaches or pain? No  Have you had trigger point injection performed and how often? No  Have you had Botox injection performed and how often? No   Have you had epidural injections or transforaminal injections performed? No  Are you current pregnant or planning on getting pregnant? No  Hysterectomy  Have you ever had any Brain imaging? yes MRI, MRA    Last eye exam: May 2023 - normal, dilated exam    What medications do you take or have you taken for your headaches?    ABORTIVE:    OTC medications: Excedrin, Tylenol (about 1 day per week)  Prescription: None    Past/ failed/contraindicated:  OTC medications: None  Prescription: Imitrex (helped, but felt funny with it)    PREVENTIVE:   None    Past/ failed/contraindicated:  None      LIFESTYLE  Sleep   - averages: about 5-6 hours per night  Problems falling asleep?:   No  Problems staying asleep?:  Yes  - Not refreshed in the morning    Physical activity: Nothing scheduled    Water: about 64oz per day  Caffeine: 2 cups of iced tea per day    Mood:   History of anxiety  - Not being treated    The following portions of the patient's history were reviewed and updated as appropriate: allergies, current medications, past family history, past medical history, past social history, past surgical history and problem list     Pertinent family history:  Family history of headaches:  migraine headaches in mother  Any family history of aneurysms - Yes - maternal half-sister    Pertinent social history:  Work:  at [employer]  Education: LPN  Lives with family    Illicit Drugs: denies  Alcohol/tobacco: Denies alcohol use, Denies tobacco use  Past Medical History:     Past Medical History:   Diagnosis Date   • BRCA1 negative    • Ear problems    • Headache(784 0)    • Hypertension        Patient Active Problem List   Diagnosis   • Hyperlipidemia   • Pre-diabetes   • Vitamin D deficiency   • Pulsatile tinnitus of both ears   • ETD (Eustachian tube dysfunction), bilateral   • Neck pain   • Intracranial hypertension   • Abnormal finding on MRI of brain       Medications:      Current Outpatient Medications   Medication Sig Dispense Refill   • ergocalciferol (VITAMIN D2) 50,000 units Take 1 capsule (50,000 Units total) by mouth once a week 12 capsule 0   • fluticasone (FLONASE) 50 mcg/act nasal spray 1 spray into each nostril 2 (two) times a day (Patient not taking: Reported on 6/21/2023) 1 g 6     No current facility-administered medications for this visit  Allergies:       Allergies   Allergen Reactions   • Penicillin G Hives "      Family History:     Family History   Problem Relation Age of Onset   • Diabetes Mother    • Cancer Mother    • Hypertension Mother    • Diabetes Father    • Hypertension Father    • Breast cancer Sister    • Diabetes Brother    • Hypertension Maternal Grandmother    • Cancer Maternal Grandfather    • Diabetes Paternal Grandmother    • Diabetes Paternal Grandfather    • Breast cancer Maternal Aunt        Social History:       Social History     Socioeconomic History   • Marital status: Single     Spouse name: Not on file   • Number of children: Not on file   • Years of education: Not on file   • Highest education level: Not on file   Occupational History   • Not on file   Tobacco Use   • Smoking status: Never   • Smokeless tobacco: Never   Vaping Use   • Vaping Use: Never used   Substance and Sexual Activity   • Alcohol use: Not Currently   • Drug use: Never   • Sexual activity: Not Currently     Partners: Male     Birth control/protection: Abstinence, Post-menopausal   Other Topics Concern   • Not on file   Social History Narrative   • Not on file     Social Determinants of Health     Financial Resource Strain: Not on file   Food Insecurity: Not on file   Transportation Needs: Not on file   Physical Activity: Not on file   Stress: Not on file   Social Connections: Not on file   Intimate Partner Violence: Not on file   Housing Stability: Not on file         Objective:   Physical Exam:                                                               Vitals:            Constitutional:  /78 (BP Location: Left arm, Patient Position: Sitting, Cuff Size: Adult)   Pulse 79   Temp 98 1 °F (36 7 °C) (Temporal)   Ht 5' 4\" (1 626 m)   Wt 75 9 kg (167 lb 4 8 oz)   SpO2 99%   BMI 28 72 kg/m²   BP Readings from Last 3 Encounters:   06/29/23 124/78   06/21/23 132/86   04/26/23 138/80     Pulse Readings from Last 3 Encounters:   06/29/23 79   06/21/23 74   04/26/23 (!) 110         Well developed, well nourished, well " groomed  No dysmorphic features  HEENT:  Normocephalic atraumatic  Oropharynx is clear and moist  No oral mucosal lesions  Chest:  Respirations regular and unlabored  Cardiovascular:  Distal extremities warm without palpable edema or tenderness, no observed significant swelling  Musculoskeletal:  (see below under neurologic exam for evaluation of motor function and gait)   Skin:  warm and dry, not diaphoretic  No apparent birthmarks or stigmata of neurocutaneous disease  Psychiatric:  Normal behavior and appropriate affect       Neurological Examination:     Mental status/cognitive function:   Orientated to time, place and person  Recent and remote memory intact  Attention span and concentration as well as fund of knowledge are appropriate for age  Normal language and spontaneous speech  Cranial Nerves:  II-visual fields full  Fundi poorly visualized due to pupillary constriction  III, IV, VI-Pupils were equal, round, and reactive to light and accomodation  Extraocular movements were full and conjugate without nystagmus  Conjugate gaze, normal smooth pursuits, normal saccades   V-facial sensation symmetric  VII-facial expression symmetric, intact forehead wrinkle, strong eye closure, symmetric smile    VIII-hearing grossly intact bilaterally   IX, X-palate elevation symmetric, no dysarthria  XI-shoulder shrug strength intact    XII-tongue protrusion midline  Motor Exam: symmetric bulk and tone throughout, no pronator drift  Power/strength 5/5 bilateral upper and lower extremities, no atrophy, fasciculations or abnormal movements noted  Sensory: grossly intact light touch in all extremities  Reflexes: brachioradialis 2+, biceps 2+, knee 2+, ankle 2+ bilaterally  No ankle clonus  Coordination: Finger nose finger intact bilaterally, no apparent dysmetria, ataxia or tremor noted  Gait: steady casual and tandem gait       Pertinent lab results: None     Pertinent Imaging:   -MRI brain with and without contrast 6/10/2023: No acute findings  No postcontrast enhancement  Redundant left AICA loops around the left 7th/8th nerve complex  Partially empty sella and mildly prominent bilateral optic nerve sheath CSF spaces  -MRA head 6/10/2023: Unremarkable imaging  No aneurysm  -MRA carotids with and without contrast 6/10/2023: Unremarkable cervical vasculature    I have personally reviewed imaging and radiology read  Review of Systems:   Constitutional: Negative for appetite change, fatigue and fever  HENT: Positive for tinnitus  Negative for hearing loss, trouble swallowing and voice change  Eyes: Negative  Negative for photophobia, pain and visual disturbance  Respiratory: Negative  Negative for shortness of breath  Cardiovascular: Negative  Negative for palpitations  Gastrointestinal: Negative  Negative for nausea and vomiting  Endocrine: Negative  Negative for cold intolerance  Genitourinary: Negative  Negative for dysuria, frequency and urgency  Musculoskeletal: Negative for back pain, gait problem, myalgias and neck pain  Skin: Negative  Negative for rash  Allergic/Immunologic: Negative  Neurological: Positive for headaches  Negative for dizziness, tremors, seizures, syncope, facial asymmetry, speech difficulty, weakness, light-headedness and numbness  Hematological: Negative  Does not bruise/bleed easily  Psychiatric/Behavioral: Negative  Negative for confusion, hallucinations and sleep disturbance  All other systems reviewed and are negative  I have spent 35 minutes with the patient today in which greater than 50% of this time was spent in counseling/coordination of care regarding Diagnostic results, Prognosis, Risks and benefits of tx options, Patient and family education, Risk factor reductions, Impressions, Documenting in the medical record, Reviewing / ordering tests, medicine, procedures   and Obtaining or reviewing history     I also spent 15 minutes non face to face for this patient the same day       Author:  Theressa Dubin, DO 6/29/2023 9:06 AM

## 2023-06-29 NOTE — PROGRESS NOTES
Review of Systems   Constitutional: Negative for appetite change, fatigue and fever  HENT: Positive for tinnitus  Negative for hearing loss, trouble swallowing and voice change  Eyes: Negative  Negative for photophobia, pain and visual disturbance  Respiratory: Negative  Negative for shortness of breath  Cardiovascular: Negative  Negative for palpitations  Gastrointestinal: Negative  Negative for nausea and vomiting  Endocrine: Negative  Negative for cold intolerance  Genitourinary: Negative  Negative for dysuria, frequency and urgency  Musculoskeletal: Negative for back pain, gait problem, myalgias and neck pain  Skin: Negative  Negative for rash  Allergic/Immunologic: Negative  Neurological: Positive for headaches  Negative for dizziness, tremors, seizures, syncope, facial asymmetry, speech difficulty, weakness, light-headedness and numbness  Hematological: Negative  Does not bruise/bleed easily  Psychiatric/Behavioral: Negative  Negative for confusion, hallucinations and sleep disturbance  All other systems reviewed and are negative

## 2023-06-29 NOTE — PATIENT INSTRUCTIONS
Additional Testing:   Home study    Headache Calendar  Please maintain a headache calendar  Consider using phone applications such as Migraine [place] or [place] Migraine Tracker    Headache/migraine treatment:   Acute medications (for immediate treatment of a headache): It is ok to take ibuprofen, acetaminophen or naproxen (Advil, Tylenol,  Aleve, Excedrin) if they help your headaches you should limit these to No more than 2-3 times a week to avoid medication overuse/rebound headaches  For your more moderate to severe migraines take this medication early  Maxalt (rizatriptan) 10mg tabs - take one at the onset of headache  May repeat one time after 2 hours if pain has not resolved  (Max 2 a day and 10 a month)     Prescription preventive medications for headaches/migraines   (to take every day to help prevent headaches - not to take at the time of headache): Topiramate  25 mg nightly for 1 week, then increase to 25 mg in a m  And 25 mg in p m  For 1 week, then take 25 mg in a m  And 50 mg in p m  For 1 week, then take 50 mg in a m  and 50 mg in p m  And continue  - generally the common side effects improve as your body gets used to the medication  If we need to spread out a more gradual increase of the medication on a longer scale we can, just call if any questions or concerns  - if necessary, if the a m  dose is causing side effects we can always have you take the full dose at night instead    - important to know per data, this medication may, but typically does not affect birth control unless you are taking 200 mg daily or more and I highly recommend being on birth control while on this medication due to possible significant detrimental effects to fetus if you were to get pregnant     The medication you are taking may impact pregnancy  It has been associated with birth defects and learning problems if taken during pregnancy  Thus, it is important to avoid unplanned pregnancy while taking this medication  In the future, if you plan to become pregnant, then you should discuss this with your neurologist since medication adjustments may be indicated  *Typically these types of medications take time until you see the benefit, although some may see improvement in days, often it may take weeks, especially if the medication is being titrated up to a beneficial level  Please contact us if there are any concerns or questions regarding the medication  Lifestyle Recommendations:  [x] SLEEP - Maintain a regular sleep schedule: Adults need at least 7-8 hours of uninterrupted a night  Maintain good sleep hygiene:  Going to bed and waking up at consistent times, avoiding excessive daytime naps, avoiding caffeinated beverages in the evening, avoid excessive stimulation in the evening and generally using bed primarily for sleeping  One hour before bedtime would recommend turning lights down lower, decreasing your activity (may read quietly, listen to music at a low volume)  When you get into bed, should eliminate all technology (no texting, emailing, playing with your phone, iPad or tablet in bed)  [x] HYDRATION - Maintain good hydration  Drink  2L of fluid a day (4 typical small water bottles)  [x] DIET - Maintain good nutrition  In particular don't skip meals and try and eat healthy balanced meals regularly  [x] TRIGGERS - Look for other triggers and avoid them: Limit caffeine to 1-2 cups a day or less  Avoid dietary triggers that you have noticed bring on your headaches (this could include aged cheese, peanuts, MSG, aspartame and nitrates)  [x] EXERCISE - physical exercise as we all know is good for you in many ways, and not only is good for your heart, but also is beneficial for your mental health, cognitive health and  chronic pain/headaches  I would encourage at the least 5 days of physical exercise weekly for at least 30 minutes  Education and Follow-up  [x] Please call with any questions or concerns   Of course if any new concerning symptoms go to the emergency department    [x] Follow up in 6 months

## 2023-06-30 ENCOUNTER — TELEPHONE (OUTPATIENT)
Dept: SLEEP CENTER | Facility: CLINIC | Age: 43
End: 2023-06-30

## 2023-06-30 NOTE — TELEPHONE ENCOUNTER
----- Message from Maryam Galindo MD sent at 6/30/2023 11:10 AM EDT -----  Approved    ----- Message -----  From: Randa Biggs  Sent: 6/30/2023   7:46 AM EDT  To: Sleep Medicine [facility] Provider    This Home sleep study needs approval      If approved please sign and return to clerical pool  If denied please include reasons why  Also provide alternative testing if warranted  Please sign and return to clerical pool

## 2023-07-12 ENCOUNTER — TELEPHONE (OUTPATIENT)
Dept: RADIOLOGY | Facility: HOSPITAL | Age: 43
End: 2023-07-12

## 2023-07-13 ENCOUNTER — TELEPHONE (OUTPATIENT)
Dept: RADIOLOGY | Facility: HOSPITAL | Age: 43
End: 2023-07-13

## 2023-07-13 ENCOUNTER — TELEPHONE (OUTPATIENT)
Dept: INTERVENTIONAL RADIOLOGY/VASCULAR | Facility: HOSPITAL | Age: 43
End: 2023-07-13

## 2023-07-13 NOTE — PRE-PROCEDURE INSTRUCTIONS
.Pre-procedure Instructions for Interventional Radiology  [address]  [address]if[address] [phone]    You are scheduled for a/an Cerebral Angiogram.    On Friday 7/21/23. Your tentative arrival time is 0700. Short stay will notify you the day before your procedure with the exact arrival time and the location to arrive. To prepare for your procedure:  1. Please arrange for someone to drive you home after the procedure and stay with you until the next morning if you are instructed to do so. This is typically for patients receiving some type of sedative or anesthetic for the procedure. 2. DO NOT EAT OR DRINK ANYTHING after midnight on the evening before your procedure including candy & gum.  3. ONLY SIPS OF WATER with your medications are allowed on the morning of your procedure. 4. TAKE ALL OF YOUR REGULAR MEDICATIONS THE MORNING OF YOUR PROCEDURE with sips of water! We may call you to stop some of your blood sugar, blood pressure and blood thinning medications depending on the procedure. Please take all of these medications unless we instruct you to stop them. 5. If you have an allergy to x-ray dye, please contact Interventional Radiology for an x-ray dye preparation which usually consists of an oral steroid and Benadryl. The day of your procedure:  1. Bring a list of the medications you take at home. 2. Bring medications you take for breathing problems (such as inhalers), medications for chest pain, or both. 3. Bring a case for your glasses or contacts. 4. Bring your insurance card and a form of photo ID.  5. Please leave all valuables such as credit cards and jewelry at home. 6. Report to the registration desk in the main lobby at the [facility], operated by [facility], Entrance B. Ask to be directed to [facility]. 7. While your procedure is being performed, your family may wait in the Radiology Waiting Room on the 1st floor in Radiology. if they need to leave, they may provide a number to be called following the procedure. 8. Be prepared to stay overnight just in case. Sometimes procedures will indicate the need for further observation or treatment. 9. If you are scheduled for a follow-up visit with the Interventional Radiologist after your procedure, you will be called with a date and time.     Special Instructions (Medications to stop taking before your procedure etc.):

## 2023-07-14 ENCOUNTER — OFFICE VISIT (OUTPATIENT)
Dept: OBGYN CLINIC | Facility: CLINIC | Age: 43
End: 2023-07-14
Payer: COMMERCIAL

## 2023-07-14 VITALS
SYSTOLIC BLOOD PRESSURE: 120 MMHG | DIASTOLIC BLOOD PRESSURE: 80 MMHG | HEIGHT: 64 IN | WEIGHT: 167 LBS | BODY MASS INDEX: 28.51 KG/M2

## 2023-07-14 DIAGNOSIS — Z12.11 ENCOUNTER FOR SCREENING COLONOSCOPY: ICD-10-CM

## 2023-07-14 DIAGNOSIS — N76.0 ACUTE VAGINITIS: ICD-10-CM

## 2023-07-14 DIAGNOSIS — Z01.419 WOMEN'S ANNUAL ROUTINE GYNECOLOGICAL EXAMINATION: Primary | ICD-10-CM

## 2023-07-14 DIAGNOSIS — Z12.31 SCREENING MAMMOGRAM FOR BREAST CANCER: ICD-10-CM

## 2023-07-14 PROCEDURE — S0610 ANNUAL GYNECOLOGICAL EXAMINA: HCPCS | Performed by: NURSE PRACTITIONER

## 2023-07-14 RX ORDER — FLUCONAZOLE 100 MG/1
100 TABLET ORAL
Qty: 2 TABLET | Refills: 0 | Status: SHIPPED | OUTPATIENT
Start: 2023-07-14 | End: 2023-07-18

## 2023-07-14 NOTE — PROGRESS NOTES
Subjective    HPI:     Nik Hidalgo is a 38 y.o. female. She is a Citizen of the [place], with a prior C/S. [place] History of a hysterectomy for fibroid uterus. Not currently sexually active. She denies  and Gyn complaints. She has some constipation. She feels safe at home. She denies depression/anxiety. Medical, surgical and family history reviewed. Her dental care is up-to-date. She eats a healthy diet and is working on exercising. She is happy with her weight. Visit Vitals  /80 (BP Location: Right arm, Patient Position: Sitting, Cuff Size: Standard)   Ht 5' 4" (1.626 m)   Wt 75.8 kg (167 lb)   BMI 28.67 kg/m²   OB Status Hysterectomy   Smoking Status Never   BSA 1.81 m²       Gynecologic History    No LMP recorded. Patient has had a hysterectomy. Last Pap: 7/15/19. Results were: normal  Last mammogram: 10/8/22. Results were: normal  Colonoscopy: referral provided    Obstetric and Medical History    OB History    Para Term  AB Living   1 1 1         SAB IAB Ectopic Multiple Live Births                  # Outcome Date GA Lbr Roosevelt/2nd Weight Sex Delivery Anes PTL Lv   1 Term                Past Medical History:   Diagnosis Date   • BRCA1 negative    • Ear problems    • Headache(784.0)    • Hypertension        Past Surgical History:   Procedure Laterality Date   • BREAST BIOPSY Right    •  SECTION     • HYSTERECTOMY         The following portions of the patient's history were reviewed and updated as appropriate: allergies, current medications, past family history, past medical history, past social history, past surgical history and problem list.    Review of Systems    Pertinent items are noted in HPI. Objective    Physical Exam  Constitutional:       Appearance: Normal appearance. She is well-developed. Genitourinary:      Vulva, bladder and urethral meatus normal.      No lesions in the vagina.       Right Labia: No rash, tenderness, lesions, skin changes or Bartholin's cyst.     Left Labia: No tenderness, lesions, skin changes, Bartholin's cyst or rash. No labial fusion noted. No inguinal adenopathy present in the right or left side. Vaginal cuff intact. Vaginal tenderness (small amount of white curd discharge ) present. No vaginal discharge, erythema, bleeding or granulation tissue. No vaginal prolapse present. No vaginal atrophy present. Right Adnexa: not palpable. Left Adnexa: not palpable. Cervix is absent. Pelvic exam was performed with patient in the lithotomy position. Breasts:     Breasts are symmetrical.      Right: No inverted nipple, mass, nipple discharge, skin change or tenderness. Left: No inverted nipple, mass, nipple discharge, skin change or tenderness. HENT:      Head: Normocephalic and atraumatic. Neck:      Thyroid: No thyromegaly. Cardiovascular:      Rate and Rhythm: Normal rate and regular rhythm. Heart sounds: Normal heart sounds, S1 normal and S2 normal.   Pulmonary:      Effort: Pulmonary effort is normal.      Breath sounds: Normal breath sounds. Abdominal:      General: Bowel sounds are normal. There is no distension. Palpations: Abdomen is soft. There is no mass. Tenderness: There is no abdominal tenderness. There is no guarding. Hernia: There is no hernia in the left inguinal area or right inguinal area. Musculoskeletal:      Cervical back: Neck supple. Lymphadenopathy:      Cervical: No cervical adenopathy. Upper Body:      Right upper body: No supraclavicular or axillary adenopathy. Left upper body: No supraclavicular or axillary adenopathy. Lower Body: No right inguinal adenopathy. No left inguinal adenopathy. Neurological:      Mental Status: She is alert. Skin:     General: Skin is warm and dry. Findings: No rash.    Psychiatric:         Attention and Perception: Attention and perception normal.         Mood and Affect: Mood and affect normal.         Speech: Speech normal.         Behavior: Behavior is cooperative. Thought Content: Thought content normal.         Cognition and Memory: Cognition and memory normal.         Judgment: Judgment normal.   Vitals and nursing note reviewed. Assessment and Plan    Yessy Vann was seen today for gynecologic exam.    Diagnoses and all orders for this visit:    Women's annual routine gynecological examination    Encounter for screening colonoscopy  -     Ambulatory Referral to Gastroenterology; Future    Screening mammogram for breast cancer  -     Mammo screening bilateral w 3d & cad; Future    Acute vaginitis  -     fluconazole (DIFLUCAN) 100 mg tablet; Take 1 tablet (100 mg total) by mouth every third day for 2 doses      Patient informed of a Stable GYN exam with exception of vaginal candida noted on exam. Diflucan sent to pharmacy for patient. A pap smear was not performed per guidelines for patients with hx of hysterectomy unrelated to malignancy. I have discussed the importance of exercise and healthy diet as well as adequate intake of calcium and vitamin D. I emphasized the importance of an annual pelvic and breast exam. A yearly mammogram is due. Results will be released to [facility], if abnormal will call to review and discuss treatment plan. All questions have been answered to her satisfaction. Mammogram ordered. Follow up in: 1 year or sooner if needed.

## 2023-07-15 ENCOUNTER — APPOINTMENT (OUTPATIENT)
Dept: LAB | Facility: HOSPITAL | Age: 43
End: 2023-07-15
Payer: COMMERCIAL

## 2023-07-15 DIAGNOSIS — G93.2 INTRACRANIAL HYPERTENSION: ICD-10-CM

## 2023-07-15 DIAGNOSIS — R90.89 ABNORMAL FINDING ON MRI OF BRAIN: ICD-10-CM

## 2023-07-15 DIAGNOSIS — H93.A3 PULSATILE TINNITUS OF BOTH EARS: ICD-10-CM

## 2023-07-15 DIAGNOSIS — E78.5 HYPERLIPIDEMIA, UNSPECIFIED HYPERLIPIDEMIA TYPE: ICD-10-CM

## 2023-07-15 DIAGNOSIS — E55.9 VITAMIN D DEFICIENCY: ICD-10-CM

## 2023-07-15 LAB
25(OH)D3 SERPL-MCNC: 30.6 NG/ML (ref 30–100)
ALBUMIN SERPL BCP-MCNC: 4.6 G/DL (ref 3.5–5)
ALP SERPL-CCNC: 67 U/L (ref 34–104)
ALT SERPL W P-5'-P-CCNC: 38 U/L (ref 7–52)
ANION GAP SERPL CALCULATED.3IONS-SCNC: 7 MMOL/L
APTT PPP: 26 SECONDS (ref 23–37)
AST SERPL W P-5'-P-CCNC: 22 U/L (ref 13–39)
BASOPHILS # BLD AUTO: 0.06 THOUSANDS/ÂΜL (ref 0–0.1)
BASOPHILS NFR BLD AUTO: 1 % (ref 0–1)
BILIRUB SERPL-MCNC: 0.33 MG/DL (ref 0.2–1)
BUN SERPL-MCNC: 16 MG/DL (ref 5–25)
CALCIUM SERPL-MCNC: 9.8 MG/DL (ref 8.4–10.2)
CHLORIDE SERPL-SCNC: 104 MMOL/L (ref 96–108)
CHOLEST SERPL-MCNC: 203 MG/DL
CO2 SERPL-SCNC: 28 MMOL/L (ref 21–32)
CREAT SERPL-MCNC: 0.81 MG/DL (ref 0.6–1.3)
EOSINOPHIL # BLD AUTO: 0.18 THOUSAND/ÂΜL (ref 0–0.61)
EOSINOPHIL NFR BLD AUTO: 3 % (ref 0–6)
ERYTHROCYTE [DISTWIDTH] IN BLOOD BY AUTOMATED COUNT: 13.9 % (ref 11.6–15.1)
GFR SERPL CREATININE-BSD FRML MDRD: 87 ML/MIN/1.73SQ M
GLUCOSE P FAST SERPL-MCNC: 91 MG/DL (ref 65–99)
HCT VFR BLD AUTO: 39.1 % (ref 34.8–46.1)
HDLC SERPL-MCNC: 54 MG/DL
HGB BLD-MCNC: 11.8 G/DL (ref 11.5–15.4)
IMM GRANULOCYTES # BLD AUTO: 0.01 THOUSAND/UL (ref 0–0.2)
IMM GRANULOCYTES NFR BLD AUTO: 0 % (ref 0–2)
INR PPP: 0.97 (ref 0.84–1.19)
LDLC SERPL CALC-MCNC: 125 MG/DL (ref 0–100)
LYMPHOCYTES # BLD AUTO: 2.22 THOUSANDS/ÂΜL (ref 0.6–4.47)
LYMPHOCYTES NFR BLD AUTO: 36 % (ref 14–44)
MCH RBC QN AUTO: 23.2 PG (ref 26.8–34.3)
MCHC RBC AUTO-ENTMCNC: 30.2 G/DL (ref 31.4–37.4)
MCV RBC AUTO: 77 FL (ref 82–98)
MONOCYTES # BLD AUTO: 0.46 THOUSAND/ÂΜL (ref 0.17–1.22)
MONOCYTES NFR BLD AUTO: 7 % (ref 4–12)
NEUTROPHILS # BLD AUTO: 3.29 THOUSANDS/ÂΜL (ref 1.85–7.62)
NEUTS SEG NFR BLD AUTO: 53 % (ref 43–75)
NRBC BLD AUTO-RTO: 0 /100 WBCS
PLATELET # BLD AUTO: 385 THOUSANDS/UL (ref 149–390)
PMV BLD AUTO: 9.7 FL (ref 8.9–12.7)
POTASSIUM SERPL-SCNC: 4.2 MMOL/L (ref 3.5–5.3)
PROT SERPL-MCNC: 7.6 G/DL (ref 6.4–8.4)
PROTHROMBIN TIME: 13 SECONDS (ref 11.6–14.5)
RBC # BLD AUTO: 5.08 MILLION/UL (ref 3.81–5.12)
SODIUM SERPL-SCNC: 139 MMOL/L (ref 135–147)
TRIGL SERPL-MCNC: 118 MG/DL
WBC # BLD AUTO: 6.22 THOUSAND/UL (ref 4.31–10.16)

## 2023-07-15 PROCEDURE — 80053 COMPREHEN METABOLIC PANEL: CPT

## 2023-07-15 PROCEDURE — 80061 LIPID PANEL: CPT

## 2023-07-15 PROCEDURE — 36415 COLL VENOUS BLD VENIPUNCTURE: CPT

## 2023-07-15 PROCEDURE — 85025 COMPLETE CBC W/AUTO DIFF WBC: CPT

## 2023-07-15 PROCEDURE — 85730 THROMBOPLASTIN TIME PARTIAL: CPT

## 2023-07-15 PROCEDURE — 85610 PROTHROMBIN TIME: CPT

## 2023-07-15 PROCEDURE — 82306 VITAMIN D 25 HYDROXY: CPT

## 2023-07-20 ENCOUNTER — ANESTHESIA EVENT (OUTPATIENT)
Dept: ANESTHESIOLOGY | Facility: HOSPITAL | Age: 43
End: 2023-07-20

## 2023-07-20 ENCOUNTER — ANESTHESIA (OUTPATIENT)
Dept: ANESTHESIOLOGY | Facility: HOSPITAL | Age: 43
End: 2023-07-20

## 2023-07-21 ENCOUNTER — ANESTHESIA (OUTPATIENT)
Dept: RADIOLOGY | Facility: HOSPITAL | Age: 43
End: 2023-07-21

## 2023-07-21 ENCOUNTER — ANESTHESIA EVENT (OUTPATIENT)
Dept: RADIOLOGY | Facility: HOSPITAL | Age: 43
End: 2023-07-21

## 2023-07-21 ENCOUNTER — HOSPITAL ENCOUNTER (OUTPATIENT)
Dept: RADIOLOGY | Facility: HOSPITAL | Age: 43
Discharge: HOME | End: 2023-07-21
Attending: NEUROLOGICAL SURGERY
Payer: COMMERCIAL

## 2023-07-21 VITALS
OXYGEN SATURATION: 95 % | TEMPERATURE: 97.1 F | SYSTOLIC BLOOD PRESSURE: 139 MMHG | WEIGHT: 167 LBS | HEIGHT: 64 IN | DIASTOLIC BLOOD PRESSURE: 74 MMHG | RESPIRATION RATE: 16 BRPM | HEART RATE: 78 BPM | BODY MASS INDEX: 28.51 KG/M2

## 2023-07-21 DIAGNOSIS — R90.89 ABNORMAL FINDING ON MRI OF BRAIN: ICD-10-CM

## 2023-07-21 DIAGNOSIS — H93.A3 PULSATILE TINNITUS OF BOTH EARS: ICD-10-CM

## 2023-07-21 DIAGNOSIS — G93.2 INTRACRANIAL HYPERTENSION: ICD-10-CM

## 2023-07-21 PROCEDURE — 36226 PLACE CATH VERTEBRAL ART: CPT

## 2023-07-21 PROCEDURE — 76937 US GUIDE VASCULAR ACCESS: CPT | Performed by: NEUROLOGICAL SURGERY

## 2023-07-21 PROCEDURE — 36224 PLACE CATH CAROTD ART: CPT

## 2023-07-21 PROCEDURE — 36227 PLACE CATH XTRNL CAROTID: CPT

## 2023-07-21 PROCEDURE — C1887 CATHETER, GUIDING: HCPCS

## 2023-07-21 PROCEDURE — 36226 PLACE CATH VERTEBRAL ART: CPT | Performed by: NEUROLOGICAL SURGERY

## 2023-07-21 PROCEDURE — 36224 PLACE CATH CAROTD ART: CPT | Performed by: NEUROLOGICAL SURGERY

## 2023-07-21 PROCEDURE — 36223 PLACE CATH CAROTID/INOM ART: CPT | Performed by: NEUROLOGICAL SURGERY

## 2023-07-21 PROCEDURE — C1769 GUIDE WIRE: HCPCS

## 2023-07-21 PROCEDURE — 76937 US GUIDE VASCULAR ACCESS: CPT

## 2023-07-21 PROCEDURE — C1894 INTRO/SHEATH, NON-LASER: HCPCS

## 2023-07-21 PROCEDURE — 36227 PLACE CATH XTRNL CAROTID: CPT | Performed by: NEUROLOGICAL SURGERY

## 2023-07-21 RX ORDER — HEPARIN SODIUM 1000 [USP'U]/ML
INJECTION, SOLUTION INTRAVENOUS; SUBCUTANEOUS AS NEEDED
Status: COMPLETED | OUTPATIENT
Start: 2023-07-21 | End: 2023-07-21

## 2023-07-21 RX ORDER — LIDOCAINE HYDROCHLORIDE 10 MG/ML
INJECTION, SOLUTION EPIDURAL; INFILTRATION; INTRACAUDAL; PERINEURAL AS NEEDED
Status: DISCONTINUED | OUTPATIENT
Start: 2023-07-21 | End: 2023-07-21

## 2023-07-21 RX ORDER — SODIUM CHLORIDE 9 MG/ML
75 INJECTION, SOLUTION INTRAVENOUS CONTINUOUS
Status: DISCONTINUED | OUTPATIENT
Start: 2023-07-21 | End: 2023-07-22 | Stop reason: HOSPADM

## 2023-07-21 RX ORDER — VERAPAMIL HYDROCHLORIDE 2.5 MG/ML
INJECTION, SOLUTION INTRAVENOUS AS NEEDED
Status: COMPLETED | OUTPATIENT
Start: 2023-07-21 | End: 2023-07-21

## 2023-07-21 RX ORDER — LIDOCAINE HYDROCHLORIDE 10 MG/ML
INJECTION, SOLUTION EPIDURAL; INFILTRATION; INTRACAUDAL; PERINEURAL AS NEEDED
Status: COMPLETED | OUTPATIENT
Start: 2023-07-21 | End: 2023-07-21

## 2023-07-21 RX ORDER — IODIXANOL 320 MG/ML
200 INJECTION, SOLUTION INTRAVASCULAR
Status: COMPLETED | OUTPATIENT
Start: 2023-07-21 | End: 2023-07-21

## 2023-07-21 RX ORDER — NITROGLYCERIN 20 MG/100ML
INJECTION INTRAVENOUS AS NEEDED
Status: COMPLETED | OUTPATIENT
Start: 2023-07-21 | End: 2023-07-21

## 2023-07-21 RX ORDER — FENTANYL CITRATE 50 UG/ML
INJECTION, SOLUTION INTRAMUSCULAR; INTRAVENOUS AS NEEDED
Status: DISCONTINUED | OUTPATIENT
Start: 2023-07-21 | End: 2023-07-21

## 2023-07-21 RX ORDER — PROPOFOL 10 MG/ML
INJECTION, EMULSION INTRAVENOUS AS NEEDED
Status: DISCONTINUED | OUTPATIENT
Start: 2023-07-21 | End: 2023-07-21

## 2023-07-21 RX ADMIN — FENTANYL CITRATE 50 MCG: 50 INJECTION INTRAMUSCULAR; INTRAVENOUS at 08:10

## 2023-07-21 RX ADMIN — PROPOFOL 60 MG: 10 INJECTION, EMULSION INTRAVENOUS at 08:13

## 2023-07-21 RX ADMIN — NITROGLYCERIN 400 MCG: 20 INJECTION INTRAVENOUS at 08:29

## 2023-07-21 RX ADMIN — VERAPAMIL HYDROCHLORIDE 5 MG: 2.5 INJECTION INTRAVENOUS at 08:29

## 2023-07-21 RX ADMIN — SODIUM CHLORIDE 75 ML/HR: 0.9 INJECTION, SOLUTION INTRAVENOUS at 07:12

## 2023-07-21 RX ADMIN — LIDOCAINE HYDROCHLORIDE 1 ML: 10 INJECTION, SOLUTION EPIDURAL; INFILTRATION; INTRACAUDAL; PERINEURAL at 08:29

## 2023-07-21 RX ADMIN — HEPARIN SODIUM 4000 UNITS: 1000 INJECTION INTRAVENOUS; SUBCUTANEOUS at 08:29

## 2023-07-21 RX ADMIN — LIDOCAINE HYDROCHLORIDE 50 MG: 10 INJECTION, SOLUTION EPIDURAL; INFILTRATION; INTRACAUDAL at 08:13

## 2023-07-21 RX ADMIN — PROPOFOL 40 MG: 10 INJECTION, EMULSION INTRAVENOUS at 08:20

## 2023-07-21 RX ADMIN — LIDOCAINE HYDROCHLORIDE 3 ML: 10 INJECTION, SOLUTION EPIDURAL; INFILTRATION; INTRACAUDAL; PERINEURAL at 08:25

## 2023-07-21 RX ADMIN — IODIXANOL 120 ML: 320 INJECTION, SOLUTION INTRAVASCULAR at 09:06

## 2023-07-21 RX ADMIN — NITROGLYCERIN 600 MCG: 20 INJECTION INTRAVENOUS at 08:25

## 2023-07-21 RX ADMIN — PROPOFOL 40 MG: 10 INJECTION, EMULSION INTRAVENOUS at 08:16

## 2023-07-21 NOTE — DISCHARGE INSTRUCTIONS
Today, you underwent a diagnostic cerebral angiogram under the care of Dr. Adriana Felix for evaluation of pulsatile tinnitus  ? The following instructions will help you care for yourself, or be cared for upon your return home today. These are guidelines for your care right after your surgery only. ? Notify Your Doctor or Nurse if you have any of the following:  ? SYMPTOMS OF WOUND INFECTION--   Increased pain in or around the incision   Swelling around the incision  Any drainage from the incision  Incision separates or opens up  Warmth in the tissues around the incision  Redness or tenderness on the skin near the incision   Fever (temperature greater than 101 degrees F)   ? NEUROLOGICAL CHANGES--  Change in alertness  Increased sleepiness   Nausea and vomiting   New onset of numbness or weakness in arms or legs   New problems with your bowels or bladder  New or worse problems with balance or walking  Seizures, new or worsening  ? UNRELIEVED HEADACHE PAIN--  New or increased pain unrelieved with pain medications   Pain associated with nausea and vomiting   Pain associated with other symptoms  ? QUESTIONS OR PROBLEMS--  Any questions or problems that you are unsure about  Wound Care:  Keep Incision Clean and Dry   You may shower daily, but do not soak incision. Pat dry after showering. No tub baths, soaking, swimming for 1 week after angiogram.   You do not need to cover the incision. Mild to moderate bruising and tenderness to the site is expected and may last up to 1-2 weeks after your procedure. ?  A closure device was placed at the catheter insertion site. This is MRI compatible. Remove the dressing 24 hours after your procedure. If your groin site is bleeding, apply firm pressure for 10 minutes. Reinforce dressing rather than removing and checking frequently. If continues to bleed through the dressing after 1 hour, contact your neurosurgeon's office. Anticipatory Education:  ?   PAIN MED W/ Acetaminophen (Tylenol)  --IF a prescription for pain medicine has been sent home with you:  --Narcotic pain medication may cause constipation. Be sure to take stool softeners or laxatives while you are on narcotic pain medication. --Do not drive after taking prescription pain medicine. ?  If this medicine is too strong, or no longer necessary, or we did NOT recommend/prescribe oral narcotics, you may take:   - Tylenol Extra-strength/Acetaminophen, 2 tablets every 4-6 hours as needed for mild pain. DO NOT TAKE MORE THAN 4000MG PER DAY from combined sources. NOTE: Remember to eat when taking pain medicines in order to avoid nausea. Watch for constipation. Eat plenty of fruits, vegetables, juices, and drink 6-8 glasses of water each day. Constipation: Stay active and drink at least 6-8 cups of fluid each day to prevent constipation. If you need a laxative or stool softener follow the package directions or consult with your local pharmacists if you have questions. ? After anesthesia, rest for 24 hours. Do not drive, drink alcohol beverages or make any important decisions during this time. General anesthesia may cause sore throat, jaw discomfort or muscle aches. These symptoms can last for one or two days. Activity: Please follow these instructions:  Advance your activity as you can tolerate. You may do light house work; nothing strenuous   You may walk all you want. You may go up and down the steps. Use the railing for support  Do not do excessive bending, straining or heavy lifting for 48 hours after your procedure  Do not drive or return to work until you are instructed   It is normal for your energy level and sleep patterns to change after surgery. Get extra sleep at night and take naps during the day to help you feel less tired. Take rest periods during the day. Complete recovery may take several weeks. ?  You may resume driving after 60-82 hours recovery. You may return to work after 48 hours of recovery. ?  Diet:  Your doctor has recommended that you follow these diet instructions at home. Refer to the patient education materials you received during your hospital stay. If you would like more nutrition counseling, ask your doctor about making an appointment with an outpatient dietitian. Resume your home diet  ? Medications:  Please resume your home medications as instructed. ? Home Supplies and Equipment:  none  Additional Contacts:  ? CONTACTS FOR NEUROSURGERY: You may call your neurosurgeon’s office if you have questions between 8:30 am and 4:30 pm. You may request to speak to the nurse practitioner who is available Monday through Friday. ?  For off hours or the weekend you may call your neurosurgeon's office to leave a message.

## 2023-07-21 NOTE — OP NOTE
OPERATIVE REPORT  PATIENT NAME: Cristal Figueredo     :  1980  MRN: [id number]   Pt Location: Interventional radiology    SURGERY DATE: 23     Preop Diagnosis:  1. Bilateral intermittent pulsatile tinnitus    Postop Diagnosis  1. Bilateral intermittent pulsatile tinnitus    Procedure:  Use of ultrasound  Right radial arteriogram   Right Common Carotid Arteriogram  Right Internal Carotid Arteriogram  Right External Carotid Arteriogram  Left Common Carotid Arteriogram  Left Internal Carotid Arteriogram  Left External Carotid Arteriogram  Right Vertebral Artery Arteriogram    Surgeon:   Paradise Mendoza MD    Specimen(s):  None    Estimated Blood Loss:   None    Drains:  None    Anesthesia Type:   Monitored Anesthesia Care     Complications:  None    Operative Indications:  Cristal Figueredo  is a very pleasant 37 y.o. female who has had intermittent pulsatile tinnitus. After discussing the risks and benefits of a diagnostic cerebral arteriogram including bleeding, stroke, groin hematoma, and death the patient elected to proceed. Procedure Details:  After obtaining written informed consent, the patient was brought into the operating room and moved to the OR table in supine fashion. The right radial artery was prepped and draped in the usual sterile fashion. Monitored anesthesia care was induced. A surgical time-out was performed. 3 cc mixture of lidocaine and 400 mcg of nitroglycerin was injected immediately above the right radial artery. Ultrasound guidance under real-time visualization was used to guide the micro puncture needle into the right radial artery. Next, a 5 [place] tapered sheath was then placed. Next and infusion of 300 mcg of nitroglycerin, 4000 units heparin, and 5 mg of verapamil were slowly injected intra-arterially through the right radial sheath. Radial artery arteriogram then performed. Next a 5 [place] Rocha glide catheter was advanced and reshaped.     The catheter was then withdrawn into the aortic arch and advanced into the left common carotid artery. AP lateral and oblique images of the left cervical carotid were obtained. The catheter was then advanced and the left internal carotid artery was then catheterized. AP lateral and magnified oblique images of the left intracranial carotid circulation were obtained. The left external carotid was then catheterized. AP lateral and oblique images were obtained. The right common carotid artery was then catheterized. AP lateral and oblique images of the right cervical carotid were obtained. The catheter was then advanced and the right internal carotid artery was then catheterized. AP lateral and magnified oblique images of the right intracranial carotid circulation were obtained. The right external carotid artery was then catheterized. AP lateral and oblique images were obtained. Right vertebral artery was catheterized. Trans facial and lateral and oblique images of the right vertebral artery circulation were then obtained. The catheter was then withdrawn from the body and a TR compression band was placed. There was appropriate hemostasis. The patient was then awoken from monitored anesthesia care and found to be in baseline neurologic condition. All sponge and needle counts were correct. INTERPRETATION OF ANGIOGRAPHIC FINDINGS:   1. The Left common carotid circulation reveals antegrade flow into the internal and external carotid arteries. There is no hemodynamically significant carotid stenosis as defined by NASCET criteria. 2. The Left internal carotid artery circulation reveals antegrade flow into the middle cerebral and anterior cerebral arteries. There is a patent anterior communicating artery. There is a fetal PCA. There is no evidence of aneurysm, AVM, or vascular malformation.   There is some stenosis of the left transverse sigmoid junction noted without evidence of delayed venous drainage. 3.  The external carotid artery on the left side has antegrade flow into the extracranial circulation. There is no evidence of fistula or intracranial anastomosis. 4. The Right common carotid circulation reveals antegrade flow into the internal and external carotid arteries. There is no hemodynamically significant carotid stenosis as defined by NASCET criteria. 5. The Right internal carotid artery circulation reveals antegrade flow into the middle cerebral and anterior cerebral arteries. There is a patent anterior communicating artery. There is a patent posterior communicating artery. There is no evidence of aneurysm, AVM, or vascular malformation. Once again left transverse sinus/sigmoid sinus narrowing is noted without any derangements of venous drainage. 6.  The right external carotid circulation has antegrade flow with intracranial circulation without any fistula or intracranial anastomosis. 5. The Right vertebral intracranial circulation reveals retrograde reflux down the contralateral vertebral artery. Both PICAs are well visualized. Antegrade flow is present into all the posterior circulation branches, with the exception of a left P1 consistent with fetal origin. There are no AVMs or aneurysms. The capillary and venous phases are unremarkable. Impression:  No evidence of dural AV fistula.   Left transverse and sigmoid narrowing of unclear significance    Patient Disposition:  APU    SIGNATURE: Nel Carney MD  DATE: 07/21/23   TIME: 9:06 AM

## 2023-07-21 NOTE — SEDATION DOCUMENTATION
Cerebral angiogram completed by Dr. Karl Chisholm without complications. Tolerated well. TR band with 17.3ml air; patent hemostasis. Bedrest 1hr start at 09:00.  Report called to [facility]

## 2023-07-21 NOTE — ANESTHESIA POSTPROCEDURE EVALUATION
Post-Op Assessment Note    CV Status:  Stable  Pain Score: 0    Pain management: adequate     Mental Status:  Alert and awake   Hydration Status:  Euvolemic   PONV Controlled:  Controlled   Airway Patency:  Patent      Post Op Vitals Reviewed: Yes      Staff: CRNA         No notable events documented.     BP      Temp      Pulse     Resp      SpO2

## 2023-07-21 NOTE — ANESTHESIA PREPROCEDURE EVALUATION
Procedure:  PRE-OP ONLY      eval for pulsatile tinnitus     Relevant Problems   CARDIO   (+) Hyperlipidemia             Anesthesia Plan  ASA Score- 2     Anesthesia Type- IV sedation with anesthesia with ASA Monitors. Additional Monitors:   Airway Plan:     Comment: NPOavaleria LOZA    Patient educated on the possibility for awareness under sedation and of the possibility of airway intervention in the event of an airway or procedural emergency  . Plan Factors-    Chart reviewed. Patient summary reviewed. Induction- intravenous. Postoperative Plan-     Informed Consent- Anesthetic plan and risks discussed with patient. I personally reviewed this patient with the CRNA. Discussed and agreed on the Anesthesia Plan with the CRNA. Jennifer Jo

## 2023-07-21 NOTE — ANESTHESIA PREPROCEDURE EVALUATION
Procedure:  IR CEREBRAL ANGIOGRAPHY      eval for pulsatile tinnitus     Relevant Problems   CARDIO   (+) Hyperlipidemia        Physical Exam    Airway    Mallampati score: II  TM Distance: >3 FB  Neck ROM: full     Dental   Comment: None loose, No notable dental hx     Cardiovascular      Pulmonary      Other Findings        Anesthesia Plan  ASA Score- 2     Anesthesia Type- IV sedation with anesthesia with ASA Monitors. Additional Monitors:   Airway Plan:     Comment: NPOafter MN    Patient educated on the possibility for awareness under sedation and of the possibility of airway intervention in the event of an airway or procedural emergency  . Plan Factors-Exercise tolerance (METS): >4 METS. Chart reviewed. Patient summary reviewed. Patient is not a current smoker. Induction- intravenous. Postoperative Plan-     Informed Consent- Anesthetic plan and risks discussed with patient. I personally reviewed this patient with the CRNA. Discussed and agreed on the Anesthesia Plan with the CRNA. Gaurang Castro

## 2023-07-21 NOTE — H&P
Patient seen and examined independently. Clinic note from 6/21/23 remains current. Patient is not on any new medications and has not had any new medical problems. /83 (BP Location: Left arm)   Pulse 84   Temp 97.9 °F (36.6 °C) (Temporal)   Resp 16   Ht 5' 4" (1.626 m)   Wt 75.8 kg (167 lb)   SpO2 100%   BMI 28.67 kg/m²  On exam, patient is neurologically intact. Heart rate is regular. Breath sounds are clear.   Plan to proceed with diagnostic cerebral arteriogram to better delineate source of PT

## 2023-07-24 ENCOUNTER — TELEPHONE (OUTPATIENT)
Dept: NEUROSURGERY | Facility: CLINIC | Age: 43
End: 2023-07-24

## 2023-07-24 NOTE — TELEPHONE ENCOUNTER
1st attempt - called Peter Nelson on primary number s/p angio performed 7/21/2023. There was no answer left message to call back direct line. Will make second attempt if no callback is received.

## 2023-07-26 NOTE — TELEPHONE ENCOUNTER
Completed post angio callback to Phillip Mojica at primary contact number. The patient denies any pain, swelling, drainage, fevers at the puncture site. Is not currently experiencing any numbness, tingling, or weakness in her arm. Reports mild headaches initially which have resolved. Advised that it is normal to experience fatigue and mild headaches for a few days after the procedure. Advised that tylenol should provide adequate relief. Explained that she should contact the office if she experiences any pain, swelling, or drainage from the site, and report to the ER or call 911 if she experiences [facility], visual disturbance, of confusion/disorientation, slurred speech, ambulatory dysfunction. Reminded of post procedure follow-up scheduled on 8/9/2023. She also wanted to notify Dr. Felipe Serra that the pulsing seems to be predominantly in the L ear as opposed to R as she initially reported. Will BRENDAI the provider. She was appreciative of call.

## 2023-07-31 DIAGNOSIS — Z20.1 EXPOSURE TO TB: Primary | ICD-10-CM

## 2023-08-03 ENCOUNTER — HOSPITAL ENCOUNTER (OUTPATIENT)
Dept: SLEEP CENTER | Facility: CLINIC | Age: 43
Discharge: HOME | End: 2023-08-03
Payer: COMMERCIAL

## 2023-08-03 DIAGNOSIS — R29.818 SUSPECTED SLEEP APNEA: ICD-10-CM

## 2023-08-03 PROCEDURE — G0399 HOME SLEEP TEST/TYPE 3 PORTA: HCPCS

## 2023-08-03 NOTE — PROGRESS NOTES
Home Sleep Study Documentation    HOME STUDY DEVICE: Noxturnal yes                                           Tania G3 no      Pre-Sleep Home Study:    Set-up and instructions performed by:  Aubrie Moe    Technician performed demonstration for Patient: yes    Return demonstration performed by Patient: yes    Written instructions provided to Patient: yes    Patient signed consent form: yes        Post-Sleep Home Study:    Additional comments by Patient: pending    Home Sleep Study Failed:pending    Failure reason: pending    Reported or Detected: pending    Scored by: pending

## 2023-08-07 ENCOUNTER — APPOINTMENT (OUTPATIENT)
Dept: LAB | Facility: CLINIC | Age: 43
End: 2023-08-07

## 2023-08-07 DIAGNOSIS — Z20.1 EXPOSURE TO TB: ICD-10-CM

## 2023-08-07 PROCEDURE — 86480 TB TEST CELL IMMUN MEASURE: CPT

## 2023-08-07 PROCEDURE — 36415 COLL VENOUS BLD VENIPUNCTURE: CPT

## 2023-08-08 ENCOUNTER — TELEPHONE (OUTPATIENT)
Dept: NEUROSURGERY | Facility: CLINIC | Age: 43
End: 2023-08-08

## 2023-08-08 NOTE — TELEPHONE ENCOUNTER
LMOM per request from EM to request if the pt would like to move her appt at 3:15 PM 8/9/23 to 1:00PM on the same day as Dr. Inés Lantigua had a cancellation he would like to try to fill in.

## 2023-08-09 ENCOUNTER — OFFICE VISIT (OUTPATIENT)
Dept: NEUROSURGERY | Facility: CLINIC | Age: 43
End: 2023-08-09
Payer: COMMERCIAL

## 2023-08-09 VITALS
TEMPERATURE: 98.7 F | WEIGHT: 168.2 LBS | SYSTOLIC BLOOD PRESSURE: 136 MMHG | BODY MASS INDEX: 28.71 KG/M2 | HEIGHT: 64 IN | DIASTOLIC BLOOD PRESSURE: 84 MMHG | OXYGEN SATURATION: 95 % | HEART RATE: 86 BPM

## 2023-08-09 DIAGNOSIS — H93.A3 PULSATILE TINNITUS OF BOTH EARS: Primary | ICD-10-CM

## 2023-08-09 LAB
GAMMA INTERFERON BACKGROUND BLD IA-ACNC: 0.04 IU/ML
M TB IFN-G BLD-IMP: NEGATIVE
M TB IFN-G CD4+ BCKGRND COR BLD-ACNC: 0 IU/ML
M TB IFN-G CD4+ BCKGRND COR BLD-ACNC: 0.01 IU/ML
MITOGEN IGNF BCKGRD COR BLD-ACNC: >10 IU/ML

## 2023-08-09 PROCEDURE — 99215 OFFICE O/P EST HI 40 MIN: CPT | Performed by: NEUROLOGICAL SURGERY

## 2023-08-09 NOTE — PROGRESS NOTES
Patient Id: Jesenia President is a 37 y.o. female        Handedness: Right / Left     Assessment/Plan:    Diagnoses and all orders for this visit:    Pulsatile tinnitus of both ears        Discussion and summary:   1. Left greater than right pulsatile tinnitus, status post arteriogram 7/21/2023  We reviewed her arteriogram in detail. 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 This may be a source of her pulsatile tinnitus. After discussing this diagnosis as well as benign implications she has elected to not pursue any further treatment of this at this juncture. She would like to continue to observe this and manage her symptoms conservatively. Should her symptoms worsen I would be happy to see her again and proceed with formal manometry of her venous sinuses. This would better help elucidate the venous gradients and potential for stenting. Given that this is invasive procedure with the risks she would like to defer this at this time. Fortunately, I do not see any aggressive vascular pathology such as dural AV fistulas that would be the causative source of this. As such at this time I will see her back as needed. If there are any further questions or concerns she can reach out to me at any time. I spent 45 minutes in the care of this patient including the review and interpretation of imaging and tests results, as well as communication/explanation to the patient  regarding the natural history of this process and test/imaging results, care coordination and documentation. Chief Complaint: Follow-up      HPI:   This is a very pleasant 47-year-old female who presents with for evaluation by ENT for pulsatile tinnitus. It is worse on the left than the right. She has no other major neurologic concerns. She is quite anxious about this. Her past medical history is significant for hypertension.   She has had a hysterectomy in the past.    She is not , she has 1 daughter age 25. She is employed full-time as a .  No history of smoking alcohol or illicit drug use. She has family history of a maternal aunt with aneurysm and grandparents with meningioma. Review of systems obtained by the MA reviewed and updated below. Review of Systems   Constitutional: Negative. HENT: Positive for tinnitus (pulsating sound in both ears). Eyes: Positive for pain (ocassional pain in right eye with headaches) and visual disturbance (floaters in both eyes for a few years now). Respiratory: Negative. Cardiovascular: Negative. Gastrointestinal: Negative. Endocrine: Negative. Genitourinary: Negative. Musculoskeletal: Negative. Negative for gait problem. Skin: Negative. Allergic/Immunologic: Negative. Neurological: Positive for dizziness (2 episodes since LOV) and headaches (a few times a week-mild right side of head right eye or both eyes describes pain as pressure lasts ). Negative for tremors, seizures, syncope, speech difficulty, weakness, light-headedness and numbness. Hematological: Negative. Does not bruise/bleed easily. Psychiatric/Behavioral: Positive for sleep disturbance (not a good sleeper). Negative for confusion. Physical Exam  Vitals:    08/09/23 1510   BP: 136/84   Pulse: 86   Temp: 98.7 °F (37.1 °C)   SpO2: 95%     She is well appearing. Affect is appropriate. Body mass index is 28.87 kg/m². [place] She is awake alert and oriented. Hearing and vision are grossly intact. Her pupils are equal round reactive to light. Her extraocular movements are intact. Her face is symmetric. Tongue is midline. Facial sensation is intact and symmetric throughout. Shoulder shrug is 5/5. There is no drift or dysmetria. She has full strength in her bilateral upper and lower extremities. She has normal muscle tone muscle bulk.   Her biceps reflexes and patellar reflexes are 2+ and symmetric. Lesley sign negative bilaterally. Sensation intact to light touch and pinprick throughout. Her gait is normal.     Her heart rate is regular. Normal respiratory effort. Abdomen nondistended. Radial pulses 2+. The following portions of the patient's history were reviewed and updated as appropriate: allergies, current medications, past family history, past medical history, past social history, past surgical history and problem list.    Active Ambulatory Problems     Diagnosis Date Noted   • Hyperlipidemia 2023   • Pre-diabetes 2023   • Vitamin D deficiency 2023   • Pulsatile tinnitus of both ears 2023   • ETD (Eustachian tube dysfunction), bilateral 2023   • Neck pain 2023   • Intracranial hypertension 2023   • Abnormal finding on MRI of brain 2023     Resolved Ambulatory Problems     Diagnosis Date Noted   • No Resolved Ambulatory Problems     Past Medical History:   Diagnosis Date   • Anemia    • BRCA1 negative    • Ear problems    • Headache(784.0)    • Hypertension        Past Surgical History:   Procedure Laterality Date   • BREAST BIOPSY Right    •  SECTION     • HYSTERECTOMY     • IR CEREBRAL ANGIOGRAPHY  2023         Current Outpatient Medications:   •  ergocalciferol (VITAMIN D2) 50,000 units, Take 1 capsule (50,000 Units total) by mouth once a week, Disp: 12 capsule, Rfl: 0  •  fluticasone (FLONASE) 50 mcg/act nasal spray, 1 spray into each nostril 2 (two) times a day (Patient taking differently: 1 spray into each nostril 2 (two) times a day as needed for allergies), Disp: 1 g, Rfl: 6  •  rizatriptan (Maxalt) 10 mg tablet, Take 1 tablet (10 mg total) by mouth as needed for migraine Take at the onset of migraine; if symptoms continue or return, may take another dose at least 2 hours after first dose.  Take no more than 2 doses in a day., Disp: 10 tablet, Rfl: 6  •  topiramate (TOPAMAX) 25 mg tablet, 1 tab PO QHS for 1 week, increase as tolerated to 1 tab BID for 1 week, then 1 tab QAM and 2 tabs QHS for 1 week and finish at 2 tabs BID. (Patient not taking: Reported on 8/9/2023), Disp: 120 tablet, Rfl: 4    Results/Data: Imaging reviewed in detail with patient as well as reports.

## 2023-08-10 PROCEDURE — 95806 SLEEP STUDY UNATT&RESP EFFT: CPT | Performed by: INTERNAL MEDICINE

## 2023-09-01 ENCOUNTER — IMMUNIZATIONS (OUTPATIENT)
Dept: FAMILY MEDICINE CLINIC | Facility: CLINIC | Age: 43
End: 2023-09-01
Payer: COMMERCIAL

## 2023-09-01 DIAGNOSIS — Z23 NEED FOR INFLUENZA VACCINATION: Primary | ICD-10-CM

## 2023-09-01 PROCEDURE — 90686 IIV4 VACC NO PRSV 0.5 ML IM: CPT

## 2023-09-01 PROCEDURE — 90471 IMMUNIZATION ADMIN: CPT

## 2023-10-27 ENCOUNTER — TELEPHONE (OUTPATIENT)
Dept: NEUROLOGY | Facility: CLINIC | Age: 43
End: 2023-10-27

## 2023-10-27 NOTE — TELEPHONE ENCOUNTER
Spoke with patient to inform her that her appt on 1/8/24 with Dr. Jasbir Orellana in [facility] will need to be rescheduled as he will not be in office that day. Patient stated she would call back to reschedule once she had her schedule and I provided her our main call back number.

## 2024-05-08 ENCOUNTER — OFFICE VISIT (OUTPATIENT)
Dept: FAMILY MEDICINE CLINIC | Facility: CLINIC | Age: 44
End: 2024-05-08
Payer: COMMERCIAL

## 2024-05-08 VITALS
WEIGHT: 166 LBS | HEIGHT: 64 IN | BODY MASS INDEX: 28.34 KG/M2 | TEMPERATURE: 97.9 F | RESPIRATION RATE: 16 BRPM | HEART RATE: 117 BPM | SYSTOLIC BLOOD PRESSURE: 144 MMHG | DIASTOLIC BLOOD PRESSURE: 90 MMHG | OXYGEN SATURATION: 99 %

## 2024-05-08 DIAGNOSIS — Z13.89 SCREENING FOR CARDIOVASCULAR, RESPIRATORY, AND GENITOURINARY DISEASES: ICD-10-CM

## 2024-05-08 DIAGNOSIS — Z13.0 SCREENING FOR DEFICIENCY ANEMIA: ICD-10-CM

## 2024-05-08 DIAGNOSIS — E78.5 HYPERLIPIDEMIA, UNSPECIFIED HYPERLIPIDEMIA TYPE: ICD-10-CM

## 2024-05-08 DIAGNOSIS — Z13.29 SCREENING FOR THYROID DISORDER: ICD-10-CM

## 2024-05-08 DIAGNOSIS — R30.0 DYSURIA: ICD-10-CM

## 2024-05-08 DIAGNOSIS — Z13.6 SCREENING FOR CARDIOVASCULAR, RESPIRATORY, AND GENITOURINARY DISEASES: ICD-10-CM

## 2024-05-08 DIAGNOSIS — R00.0 TACHYCARDIA: ICD-10-CM

## 2024-05-08 DIAGNOSIS — Z13.83 SCREENING FOR CARDIOVASCULAR, RESPIRATORY, AND GENITOURINARY DISEASES: ICD-10-CM

## 2024-05-08 DIAGNOSIS — B37.9 YEAST INFECTION: Primary | ICD-10-CM

## 2024-05-08 DIAGNOSIS — R73.03 PRE-DIABETES: ICD-10-CM

## 2024-05-08 LAB
SL AMB  POCT GLUCOSE, UA: NORMAL
SL AMB LEUKOCYTE ESTERASE,UA: NORMAL
SL AMB POCT BILIRUBIN,UA: NORMAL
SL AMB POCT BLOOD,UA: NORMAL
SL AMB POCT CLARITY,UA: CLEAR
SL AMB POCT COLOR,UA: NORMAL
SL AMB POCT KETONES,UA: NORMAL
SL AMB POCT NITRITE,UA: NORMAL
SL AMB POCT PH,UA: 6
SL AMB POCT SPECIFIC GRAVITY,UA: 1.01
SL AMB POCT URINE PROTEIN: NORMAL
SL AMB POCT UROBILINOGEN: 0.2

## 2024-05-08 PROCEDURE — 81002 URINALYSIS NONAUTO W/O SCOPE: CPT | Performed by: FAMILY MEDICINE

## 2024-05-08 PROCEDURE — 99214 OFFICE O/P EST MOD 30 MIN: CPT | Performed by: FAMILY MEDICINE

## 2024-05-08 RX ORDER — FLUCONAZOLE 150 MG/1
TABLET ORAL
Qty: 2 TABLET | Refills: 0 | Status: SHIPPED | OUTPATIENT
Start: 2024-05-08 | End: 2024-05-09

## 2024-05-08 NOTE — PROGRESS NOTES
Name: Simi Shah      : 1980      MRN: [id number]  Encounter Provider: Candace Sandoval MD  Encounter Date: 2024   Encounter department: [facility]    Assessment & Plan     1. Yeast infection  -     fluconazole (DIFLUCAN) 150 mg tablet; 1 tab PO x1 dose, may repeat in 3 days if still symptomatic    2. Dysuria  Comments:  Likely due to vaginal irritation from yeast infection.  Orders:  -     POCT urine dip    3. Screening for deficiency anemia  -     CBC and differential; Future    4. Screening for cardiovascular, respiratory, and genitourinary diseases    5. Screening for thyroid disorder  -     TSH, 3rd generation with Free T4 reflex; Future    6. Hyperlipidemia, unspecified hyperlipidemia type  -     Comprehensive metabolic panel; Future  -     Lipid Panel with Direct LDL reflex; Future    7. Pre-diabetes  -     Hemoglobin A1C; Future    8. Tachycardia  Comments:  She reports being very nervous and her HR is usually on the higher end. Otherwise asymptomatic.  Orders:  -     CBC and differential; Future  -     Comprehensive metabolic panel; Future  -     TSH, 3rd generation with Free T4 reflex; Future           Subjective      HPI  Simi reports vaginal itching, white thick cottage cheese vaginal discharge, burning on urination occasionally. Symptoms began 1 week ago. She does have similar symptoms frequently.     Review of Systems   Constitutional: Negative.    HENT: Negative.     Eyes: Negative.    Respiratory: Negative.     Cardiovascular: Negative.    Gastrointestinal: Negative.    Endocrine: Negative.    Genitourinary:  Positive for dysuria, vaginal discharge and vaginal pain.   Musculoskeletal: Negative.    Skin: Negative.    Allergic/Immunologic: Negative.    Neurological: Negative.    Hematological: Negative.    Psychiatric/Behavioral: Negative.         Current Outpatient Medications on File Prior to Visit   Medication Sig    ergocalciferol (VITAMIN D2) 50,000 units Take 1  "capsule (50,000 Units total) by mouth once a week    rizatriptan (Maxalt) 10 mg tablet Take 1 tablet (10 mg total) by mouth as needed for migraine Take at the onset of migraine; if symptoms continue or return, may take another dose at least 2 hours after first dose. Take no more than 2 doses in a day. (Patient not taking: Reported on 5/8/2024)    topiramate (TOPAMAX) 25 mg tablet 1 tab PO QHS for 1 week, increase as tolerated to 1 tab BID for 1 week, then 1 tab QAM and 2 tabs QHS for 1 week and finish at 2 tabs BID. (Patient not taking: Reported on 8/9/2023)       Objective     /90   Pulse (!) 117   Temp 97.9 °F (36.6 °C)   Resp 16   Ht 5' 4\" (1.626 m)   Wt 75.3 kg (166 lb)   SpO2 99%   BMI 28.49 kg/m²     Physical Exam  Constitutional:       General: She is not in acute distress.     Appearance: She is well-developed. She is not diaphoretic.   HENT:      Head: Normocephalic and atraumatic.   Cardiovascular:      Rate and Rhythm: Normal rate and regular rhythm.      Heart sounds: Normal heart sounds. No murmur heard.     No friction rub. No gallop.   Pulmonary:      Effort: Pulmonary effort is normal. No respiratory distress.      Breath sounds: Normal breath sounds. No wheezing or rales.   Chest:      Chest wall: No tenderness.   Musculoskeletal:         General: No deformity. Normal range of motion.      Cervical back: Normal range of motion and neck supple.   Skin:     General: Skin is warm and dry.   Neurological:      Mental Status: She is alert and oriented to person, place, and time.   Psychiatric:         Behavior: Behavior normal.         Thought Content: Thought content normal.         Judgment: Judgment normal.       Candace Sandoval MD    "

## 2024-05-21 ENCOUNTER — NURSE TRIAGE (OUTPATIENT)
Age: 44
End: 2024-05-21

## 2024-05-21 NOTE — TELEPHONE ENCOUNTER
"Pt reports vaginal irritation, mild itching and watery discharge since the beginning of the month. She saw her PCP and was given diflucan which helped a little but symptoms are worsening again. Denies rash, odor, bleeding. Pt was already scheduled for appointment next week and states she is okay to wait until then. She will call back/call PCP/UC if symptoms become more bothersome in the meantime.     Reason for Disposition   All other vaginal symptoms (Exception: feels like prior yeast infection, minor abrasion, mild rash < 24 hour duration, mild itching)    Answer Assessment - Initial Assessment Questions  1. SYMPTOM: \"What's the main symptom you're concerned about?\" (e.g., pain, itching, dryness)      Vaginal irritation, itching and watery discharge  2. LOCATION: \"Where is the  symptoms located?\" (e.g., inside/outside, left/right)      vagina  3. ONSET: \"When did the  symptoms  start?\"      A few weeks ago  4. PAIN: \"Is there any pain?\" If Yes, ask: \"How bad is it?\" (Scale: 1-10; mild, moderate, severe)      irritation  5. ITCHING: \"Is there any itching?\" If Yes, ask: \"How bad is it?\" (Scale: 1-10; mild, moderate, severe)      mild  6. CAUSE: \"What do you think is causing the discharge?\" \"Have you had the same problem before? What happened then?\"      unsure  7. OTHER SYMPTOMS: \"Do you have any other symptoms?\" (e.g., fever, itching, vaginal bleeding, pain with urination, injury to genital area, vaginal foreign body)      itching  8. PREGNANCY: \"Is there any chance you are pregnant?\" \"When was your last menstrual period?\"      Denies, hysterectomy    Protocols used: Vaginal Symptoms-ADULT-[place]    "

## 2024-05-28 ENCOUNTER — OFFICE VISIT (OUTPATIENT)
Dept: GYNECOLOGY | Facility: CLINIC | Age: 44
End: 2024-05-28
Payer: COMMERCIAL

## 2024-05-28 VITALS
HEIGHT: 64 IN | DIASTOLIC BLOOD PRESSURE: 96 MMHG | SYSTOLIC BLOOD PRESSURE: 134 MMHG | WEIGHT: 164.2 LBS | BODY MASS INDEX: 28.03 KG/M2

## 2024-05-28 DIAGNOSIS — N89.8 VAGINAL IRRITATION: Primary | ICD-10-CM

## 2024-05-28 DIAGNOSIS — R30.0 BURNING WITH URINATION: ICD-10-CM

## 2024-05-28 DIAGNOSIS — N89.8 VAGINAL DISCHARGE: ICD-10-CM

## 2024-05-28 LAB
SL AMB  POCT GLUCOSE, UA: ABNORMAL
SL AMB LEUKOCYTE ESTERASE,UA: ABNORMAL
SL AMB POCT BILIRUBIN,UA: ABNORMAL
SL AMB POCT BLOOD,UA: ABNORMAL
SL AMB POCT CLARITY,UA: CLEAR
SL AMB POCT COLOR,UA: YELLOW
SL AMB POCT KETONES,UA: ABNORMAL
SL AMB POCT NITRITE,UA: ABNORMAL
SL AMB POCT PH,UA: ABNORMAL
SL AMB POCT SPECIFIC GRAVITY,UA: ABNORMAL
SL AMB POCT URINE PROTEIN: ABNORMAL
SL AMB POCT UROBILINOGEN: ABNORMAL

## 2024-05-28 PROCEDURE — 87591 N.GONORRHOEAE DNA AMP PROB: CPT | Performed by: PHYSICIAN ASSISTANT

## 2024-05-28 PROCEDURE — 87491 CHLMYD TRACH DNA AMP PROBE: CPT | Performed by: PHYSICIAN ASSISTANT

## 2024-05-28 PROCEDURE — 99213 OFFICE O/P EST LOW 20 MIN: CPT | Performed by: PHYSICIAN ASSISTANT

## 2024-05-28 PROCEDURE — 87070 CULTURE OTHR SPECIMN AEROBIC: CPT | Performed by: PHYSICIAN ASSISTANT

## 2024-05-28 PROCEDURE — 81002 URINALYSIS NONAUTO W/O SCOPE: CPT | Performed by: PHYSICIAN ASSISTANT

## 2024-05-31 LAB
C TRACH DNA SPEC QL NAA+PROBE: NEGATIVE
N GONORRHOEA DNA SPEC QL NAA+PROBE: NEGATIVE

## 2024-06-01 LAB — BACTERIA GENITAL AEROBE CULT: NORMAL

## 2024-06-10 NOTE — PROGRESS NOTES
Assessment/Plan:      Diagnoses and all orders for this visit:    Vaginal irritation  -     Genital Comprehensive Culture  -     Chlamydia/GC amplified DNA by PCR    Vaginal discharge  -     Genital Comprehensive Culture  -     Chlamydia/GC amplified DNA by PCR    Burning with urination  -     POCT urine dip  -     Genital Comprehensive Culture  -     Chlamydia/GC amplified DNA by PCR          Subjective:     Patient ID: Simi Shah is a 43 y.o. female.    Pt presents for a problem today  She complains of vaginal irritation x 2 weeks  Some increase in vaginal discharge  +itching  Used Diflcuan but it did not help  No odor  Bowel and bladder ok  No bleeding--s/p hyster    Ua neg  Cultures done  Products discussed  Daily probiotic  Witch hazel prn        Review of Systems   Constitutional:  Negative for chills, fever and unexpected weight change.   HENT:  Negative for ear pain and sore throat.    Eyes:  Negative for pain and visual disturbance.   Respiratory:  Negative for cough and shortness of breath.    Cardiovascular:  Negative for chest pain and palpitations.   Gastrointestinal:  Negative for abdominal pain, blood in stool, constipation, diarrhea and vomiting.   Genitourinary:  Positive for vaginal discharge and vaginal pain. Negative for dysuria, hematuria and vaginal bleeding.   Musculoskeletal:  Negative for arthralgias and back pain.   Skin:  Negative for color change and rash.   Neurological:  Negative for seizures and syncope.   All other systems reviewed and are negative.        Objective:     Physical Exam  Vitals and nursing note reviewed.   Constitutional:       Appearance: She is well-developed.   Genitourinary:     General: Normal vulva.      Exam position: Supine.      Labia:         Right: No rash or lesion.         Left: No rash or lesion.       Vagina: Normal.      Cervix: No cervical motion tenderness, discharge or friability.      Uterus: Absent.    Lymphadenopathy:      Lower Body: No right  inguinal adenopathy. No left inguinal adenopathy.

## 2024-06-21 ENCOUNTER — TELEPHONE (OUTPATIENT)
Dept: GASTROENTEROLOGY | Facility: CLINIC | Age: 44
End: 2024-06-21

## 2024-06-21 NOTE — TELEPHONE ENCOUNTER
06/21/24  Screened by: JUSTINE    Referring Provider MIKEY Stack    Pre- Screening:     BMI: 28.18 kg/m  Has patient been referred for a routine screening Colonoscopy? yes  Is the patient between 45-75 years old? yes      Previous Colonoscopy no        SCHEDULING STAFF: If the patient is between 45yrs-49yrs, please advise patient to confirm benefits/coverage with their insurance company for a routine screening colonoscopy, some insurance carriers will only cover at 50yrs or older. If the patient is over 75years old, please schedule an office visit.     Does the patient want to see a Gastroenterologist prior to their procedure OR are they having any GI symptoms? no    Has the patient been hospitalized or had abdominal surgery in the past 6 months? no    Does the patient use supplemental oxygen? no    Does the patient take Coumadin, Lovenox, Plavix, Elliquis, Xarelto, or other blood thinning medication? no    Has the patient had a stroke, cardiac event, or stent placed in the past year? no    SCHEDULING STAFF: If patient answers NO to above questions, then schedule procedure. If patient answers YES to above questions, then schedule office appointment.     If patient is between 45yrs - 49yrs, please advise patient that we will have to confirm benefits & coverage with their insurance company for a routine screening colonoscopy.

## 2024-07-02 ENCOUNTER — AMB VIDEO VISIT (OUTPATIENT)
Dept: OTHER | Facility: HOSPITAL | Age: 44
End: 2024-07-02
Payer: COMMERCIAL

## 2024-07-02 DIAGNOSIS — R59.1 LYMPHADENOPATHY: Primary | ICD-10-CM

## 2024-07-02 PROCEDURE — 99212 OFFICE O/P EST SF 10 MIN: CPT | Performed by: PHYSICIAN ASSISTANT

## 2024-07-02 RX ORDER — CLINDAMYCIN HYDROCHLORIDE 300 MG/1
300 CAPSULE ORAL 3 TIMES DAILY
Qty: 21 CAPSULE | Refills: 0 | Status: SHIPPED | OUTPATIENT
Start: 2024-07-02 | End: 2024-07-09

## 2024-07-02 RX ORDER — PREDNISONE 20 MG/1
40 TABLET ORAL DAILY
Qty: 6 TABLET | Refills: 0 | Status: SHIPPED | OUTPATIENT
Start: 2024-07-02 | End: 2024-07-05

## 2024-07-02 RX ORDER — ASCORBATE CALCIUM 500 MG
500 TABLET ORAL DAILY
COMMUNITY

## 2024-07-03 ENCOUNTER — AMB VIDEO VISIT (OUTPATIENT)
Dept: OTHER | Facility: HOSPITAL | Age: 44
End: 2024-07-03

## 2024-07-03 NOTE — PROGRESS NOTES
Required Documentation:  Encounter provider: Shannon D Severino, PA-C    Identify all parties in room with patient during virtual visit:  No one else    The patient was identified by name and date of birth. Simi Shah was informed that this is a telemedicine visit and that the visit is being conducted through the [employer] platform. She agrees to proceed..  My office door was closed. No one else was in the room.  She acknowledged consent and understanding of privacy and security of the video platform. The patient has agreed to participate and understands they can discontinue the visit at any time.    Verification of patient location:  Patient is located at Home in the following state in which I hold an active license [place]    Patient is aware this is a billable service.     Reason for visit is No chief complaint on file.       Subjective  HPI   Pt reports 1 week ago developed pain and swelling to R gland in neck with congestion, HA, fatigue. Most cold sx are resolved. Some cough, fatigue. Swelling of glands in neck is more painful. No overlying skin changes. No fevers. No ear pain. No sore throat. No abdominal pain, yellowing of skin/eyes. Boyfriend had similar sx recently. Does have some clogged ear sensation    Past Medical History:   Diagnosis Date    Anemia     BRCA1 negative     Ear problems     Headache(784.0)     Hyperlipidemia     Hypertension        Past Surgical History:   Procedure Laterality Date    BREAST BIOPSY Right 2010     SECTION      HYSTERECTOMY      IR CEREBRAL ANGIOGRAPHY  2023        Allergies   Allergen Reactions    Penicillin G Hives       Review of Systems   Constitutional:  Negative for fever.   HENT:  Negative for nosebleeds.    Eyes:  Negative for redness.   Respiratory:  Negative for shortness of breath.    Cardiovascular:  Negative for chest pain.   Gastrointestinal:  Negative for blood in stool.   Genitourinary:  Negative for hematuria.   Musculoskeletal:   Negative for gait problem.   Skin:  Negative for rash.   Neurological:  Negative for seizures.   Hematological:  Positive for adenopathy.   Psychiatric/Behavioral:  Negative for behavioral problems.        Video Exam    There were no vitals filed for this visit.    Physical Exam  Constitutional:       General: She is not in acute distress.     Appearance: Normal appearance. She is not toxic-appearing.   HENT:      Head: Normocephalic and atraumatic.      Nose: No rhinorrhea.      Mouth/Throat:      Mouth: Mucous membranes are moist.   Eyes:      Conjunctiva/sclera: Conjunctivae normal.   Pulmonary:      Effort: Pulmonary effort is normal. No respiratory distress.      Breath sounds: No wheezing (no gross audible wheeze through computer).   Musculoskeletal:      Cervical back: Normal range of motion.   Lymphadenopathy:      Head:      Right side of head: Tonsillar adenopathy present. No submandibular, preauricular, posterior auricular or occipital adenopathy.      Cervical: Cervical adenopathy present.      Right cervical: Posterior cervical adenopathy present. No superficial cervical adenopathy.  Skin:     Findings: No rash (on face or neck).   Neurological:      Mental Status: She is alert.      Cranial Nerves: No dysarthria or facial asymmetry.   Psychiatric:         Mood and Affect: Mood normal.         Behavior: Behavior normal.         Visit Time  Total Visit Duration: 13 minutes    Assessment/Plan:    Diagnoses and all orders for this visit:    Lymphadenopathy  -     predniSONE 20 mg tablet; Take 2 tablets (40 mg total) by mouth daily for 3 days  -     clindamycin (CLEOCIN) 300 MG capsule; Take 1 capsule (300 mg total) by mouth 3 (three) times a day for 7 days        Patient Instructions   Patient Education     Lymphadenitis Discharge Instructions   About this topic   Lymphadenitis is another word for a swollen lymph node. A lymph node is a pea-shaped part of the lymph system that helps defend our body from  germs. You have lymph nodes all over your body. They help trap foreign material and other cells that do not belong inside the body.  A swollen lymph node may mean there is an infection or disease in the body. Treatment for this condition depends on the cause. Most often, you will need drugs to treat your illness.  What care is needed at home?   Ask your doctor what you need to do when you go home. Make sure you ask questions if you do not understand what the doctor says.  Do not squeeze or try to drain the lymph node. This can make any infection you may have worse.  Place an ice pack or a bag of frozen peas wrapped in a towel over the painful part. Never put ice right on the skin. Do not leave the ice on more than 10 to 15 minutes at a time.  If your doctor tells you to use heat, put a heating pad on your painful area for no more than 20 minutes at a time. Never go to sleep with a heating pad on as this can cause burns.  What follow-up care is needed?   Your doctor may ask you to make visits to the office to check on your progress. Be sure to keep these visits.  What drugs may be needed?   The doctor may order drugs to:  Help with pain and swelling  Treat an infection  Treat the illness that causes the lymph nodes to swell and be painful  What problems could happen?   Infection  Pain  When do I need to call the doctor?   Signs of infection. These include a fever of 100.4°F (38°C) or higher, chills.  More swelling, redness, or warmth over your lymph node.  Lymph node is draining fluid or pus.  Lymph nodes seem to be getting bigger.  Night sweats  Trouble swallowing or breathing  Teach Back: Helping You Understand   The Teach Back Method helps you understand the information we are giving you. After you talk with the staff, tell them in your own words what you learned. This helps to make sure the staff has described each thing clearly. It also helps to explain things that may have been confusing. Before going home,  make sure you can do these:  I can tell you about my condition.  I can tell you what may help ease my pain.  I can tell you what I will do if I have a fever, my lymph nodes stay large or seem to be getting bigger, or I have trouble breathing or swallowing.  Last Reviewed Date   2019-04-18  Consumer Information Use and Disclaimer   This generalized information is a limited summary of diagnosis, treatment, and/or medication information. It is not meant to be comprehensive and should be used as a tool to help the user understand and/or assess potential diagnostic and treatment options. It does NOT include all information about conditions, treatments, medications, side effects, or risks that may apply to a specific patient. It is not intended to be medical advice or a substitute for the medical advice, diagnosis, or treatment of a health care provider based on the health care provider's examination and assessment of a patient’s specific and unique circumstances. Patients must speak with a health care provider for complete information about their health, medical questions, and treatment options, including any risks or benefits regarding use of medications. This information does not endorse any treatments or medications as safe, effective, or approved for treating a specific patient. Up[employer], Inc[employer] and its affiliates disclaim any warranty or liability relating to this information or the use thereof. The use of this information is governed by the Terms of Use, available at https://www.woltersEmbedsteruwer.com/en/know/clinical-effectiveness-terms   Copyright   Copyright © 2024 [employer], Inc[employer] and its affiliates and/or licensors. All rights reserved.

## 2024-07-03 NOTE — PATIENT INSTRUCTIONS
Patient Education     Lymphadenitis Discharge Instructions   About this topic   Lymphadenitis is another word for a swollen lymph node. A lymph node is a pea-shaped part of the lymph system that helps defend our body from germs. You have lymph nodes all over your body. They help trap foreign material and other cells that do not belong inside the body.  A swollen lymph node may mean there is an infection or disease in the body. Treatment for this condition depends on the cause. Most often, you will need drugs to treat your illness.  What care is needed at home?   Ask your doctor what you need to do when you go home. Make sure you ask questions if you do not understand what the doctor says.  Do not squeeze or try to drain the lymph node. This can make any infection you may have worse.  Place an ice pack or a bag of frozen peas wrapped in a towel over the painful part. Never put ice right on the skin. Do not leave the ice on more than 10 to 15 minutes at a time.  If your doctor tells you to use heat, put a heating pad on your painful area for no more than 20 minutes at a time. Never go to sleep with a heating pad on as this can cause burns.  What follow-up care is needed?   Your doctor may ask you to make visits to the office to check on your progress. Be sure to keep these visits.  What drugs may be needed?   The doctor may order drugs to:  Help with pain and swelling  Treat an infection  Treat the illness that causes the lymph nodes to swell and be painful  What problems could happen?   Infection  Pain  When do I need to call the doctor?   Signs of infection. These include a fever of 100.4°F (38°C) or higher, chills.  More swelling, redness, or warmth over your lymph node.  Lymph node is draining fluid or pus.  Lymph nodes seem to be getting bigger.  Night sweats  Trouble swallowing or breathing  Teach Back: Helping You Understand   The Teach Back Method helps you understand the information we are giving you. After  you talk with the staff, tell them in your own words what you learned. This helps to make sure the staff has described each thing clearly. It also helps to explain things that may have been confusing. Before going home, make sure you can do these:  I can tell you about my condition.  I can tell you what may help ease my pain.  I can tell you what I will do if I have a fever, my lymph nodes stay large or seem to be getting bigger, or I have trouble breathing or swallowing.  Last Reviewed Date   2019-04-18  Consumer Information Use and Disclaimer   This generalized information is a limited summary of diagnosis, treatment, and/or medication information. It is not meant to be comprehensive and should be used as a tool to help the user understand and/or assess potential diagnostic and treatment options. It does NOT include all information about conditions, treatments, medications, side effects, or risks that may apply to a specific patient. It is not intended to be medical advice or a substitute for the medical advice, diagnosis, or treatment of a health care provider based on the health care provider's examination and assessment of a patient’s specific and unique circumstances. Patients must speak with a health care provider for complete information about their health, medical questions, and treatment options, including any risks or benefits regarding use of medications. This information does not endorse any treatments or medications as safe, effective, or approved for treating a specific patient. [employer] and its affiliates disclaim any warranty or liability relating to this information or the use thereof. The use of this information is governed by the Terms of Use, available at https://www.wolterskluwer.com/en/know/clinical-effectiveness-terms   Copyright   Copyright © 2024 [employer] and its affiliates and/or licensors. All rights reserved.

## 2024-07-03 NOTE — CARE ANYWHERE EVISITS
Visit Summary for MARLYS ROWE - Gender: Female - Date of Birth: 1980  Date: [id number] - Duration: 12 minutes  Patient: MARLYS ROWE  Provider: Shannon Severino PA-C    Patient Contact Information  Address  [address] [place]; [place] [address]  [phone]    Visit Topics  Headache [Added By: Self - 2024-07-03]  Flu-Like Symptoms [Added By: Self - 2024-07-03]  Cold [Added By: Self - 2024-07-03]    Triage Questions   What is your current physical address in the event of a medical emergency? Answer []  Are you allergic to any medications? Answer []  Are you now or could you be pregnant? Answer []  Do you have any immune system compromise or chronic lung   disease? Answer []  Do you have any vulnerable family members in the home (infant, pregnant, cancer, elderly)? Answer []     Conversation Transcripts  [0A][0A] [Notification] You are connected with Shannon Severino PA-C, Urgent Care Specialist.[0A][Notification] MARLYS ROWE is located in [place].[0A][Notification] MARLYS ROWE has shared health history...[0A]    Diagnosis  Acute lymphadenitis of face, head and neck    Procedures  Value: 10238 Code: CPT-4 UNLISTED E&M SERVICE    Medications Prescribed    No prescriptions ordered    Electronically signed by: Severino PA-C, Shannon(NPI [id number])

## 2024-07-27 ENCOUNTER — APPOINTMENT (OUTPATIENT)
Dept: LAB | Facility: HOSPITAL | Age: 44
End: 2024-07-27
Payer: COMMERCIAL

## 2024-07-27 DIAGNOSIS — Z13.0 SCREENING FOR DEFICIENCY ANEMIA: ICD-10-CM

## 2024-07-27 DIAGNOSIS — R73.03 PRE-DIABETES: ICD-10-CM

## 2024-07-27 DIAGNOSIS — Z00.8 HEALTH EXAMINATION IN POPULATION SURVEY: ICD-10-CM

## 2024-07-27 DIAGNOSIS — E78.5 HYPERLIPIDEMIA, UNSPECIFIED HYPERLIPIDEMIA TYPE: ICD-10-CM

## 2024-07-27 DIAGNOSIS — R00.0 TACHYCARDIA: ICD-10-CM

## 2024-07-27 DIAGNOSIS — Z13.29 SCREENING FOR THYROID DISORDER: ICD-10-CM

## 2024-07-27 LAB
ALBUMIN SERPL BCG-MCNC: 4.5 G/DL (ref 3.5–5)
ALP SERPL-CCNC: 60 U/L (ref 34–104)
ALT SERPL W P-5'-P-CCNC: 49 U/L (ref 7–52)
ANION GAP SERPL CALCULATED.3IONS-SCNC: 7 MMOL/L (ref 4–13)
AST SERPL W P-5'-P-CCNC: 24 U/L (ref 13–39)
BASOPHILS # BLD AUTO: 0.06 THOUSANDS/ÂΜL (ref 0–0.1)
BASOPHILS NFR BLD AUTO: 1 % (ref 0–1)
BILIRUB SERPL-MCNC: 0.42 MG/DL (ref 0.2–1)
BUN SERPL-MCNC: 12 MG/DL (ref 5–25)
CALCIUM SERPL-MCNC: 9.7 MG/DL (ref 8.4–10.2)
CHLORIDE SERPL-SCNC: 102 MMOL/L (ref 96–108)
CHOLEST SERPL-MCNC: 233 MG/DL
CO2 SERPL-SCNC: 29 MMOL/L (ref 21–32)
CREAT SERPL-MCNC: 0.79 MG/DL (ref 0.6–1.3)
EOSINOPHIL # BLD AUTO: 0.2 THOUSAND/ÂΜL (ref 0–0.61)
EOSINOPHIL NFR BLD AUTO: 3 % (ref 0–6)
ERYTHROCYTE [DISTWIDTH] IN BLOOD BY AUTOMATED COUNT: 14.6 % (ref 11.6–15.1)
EST. AVERAGE GLUCOSE BLD GHB EST-MCNC: 140 MG/DL
GFR SERPL CREATININE-BSD FRML MDRD: 90 ML/MIN/1.73SQ M
GLUCOSE P FAST SERPL-MCNC: 101 MG/DL (ref 65–99)
HBA1C MFR BLD: 6.5 %
HCT VFR BLD AUTO: 38.9 % (ref 34.8–46.1)
HDLC SERPL-MCNC: 68 MG/DL
HGB BLD-MCNC: 11.6 G/DL (ref 11.5–15.4)
IMM GRANULOCYTES # BLD AUTO: 0.01 THOUSAND/UL (ref 0–0.2)
IMM GRANULOCYTES NFR BLD AUTO: 0 % (ref 0–2)
LDLC SERPL CALC-MCNC: 138 MG/DL (ref 0–100)
LYMPHOCYTES # BLD AUTO: 1.8 THOUSANDS/ÂΜL (ref 0.6–4.47)
LYMPHOCYTES NFR BLD AUTO: 28 % (ref 14–44)
MCH RBC QN AUTO: 23.4 PG (ref 26.8–34.3)
MCHC RBC AUTO-ENTMCNC: 29.8 G/DL (ref 31.4–37.4)
MCV RBC AUTO: 79 FL (ref 82–98)
MONOCYTES # BLD AUTO: 0.54 THOUSAND/ÂΜL (ref 0.17–1.22)
MONOCYTES NFR BLD AUTO: 9 % (ref 4–12)
NEUTROPHILS # BLD AUTO: 3.74 THOUSANDS/ÂΜL (ref 1.85–7.62)
NEUTS SEG NFR BLD AUTO: 59 % (ref 43–75)
NRBC BLD AUTO-RTO: 0 /100 WBCS
PLATELET # BLD AUTO: 328 THOUSANDS/UL (ref 149–390)
PMV BLD AUTO: 10.2 FL (ref 8.9–12.7)
POTASSIUM SERPL-SCNC: 4.4 MMOL/L (ref 3.5–5.3)
PROT SERPL-MCNC: 7.8 G/DL (ref 6.4–8.4)
RBC # BLD AUTO: 4.95 MILLION/UL (ref 3.81–5.12)
SODIUM SERPL-SCNC: 138 MMOL/L (ref 135–147)
TRIGL SERPL-MCNC: 133 MG/DL
TSH SERPL DL<=0.05 MIU/L-ACNC: 2.04 UIU/ML (ref 0.45–4.5)
WBC # BLD AUTO: 6.35 THOUSAND/UL (ref 4.31–10.16)

## 2024-07-27 PROCEDURE — 80053 COMPREHEN METABOLIC PANEL: CPT

## 2024-07-27 PROCEDURE — 36415 COLL VENOUS BLD VENIPUNCTURE: CPT

## 2024-07-27 PROCEDURE — 83036 HEMOGLOBIN GLYCOSYLATED A1C: CPT

## 2024-07-27 PROCEDURE — 85025 COMPLETE CBC W/AUTO DIFF WBC: CPT

## 2024-07-27 PROCEDURE — 84443 ASSAY THYROID STIM HORMONE: CPT

## 2024-07-27 PROCEDURE — 80061 LIPID PANEL: CPT

## 2024-07-31 ENCOUNTER — OFFICE VISIT (OUTPATIENT)
Dept: FAMILY MEDICINE CLINIC | Facility: CLINIC | Age: 44
End: 2024-07-31
Payer: COMMERCIAL

## 2024-07-31 VITALS
WEIGHT: 165 LBS | TEMPERATURE: 97.6 F | BODY MASS INDEX: 28.17 KG/M2 | DIASTOLIC BLOOD PRESSURE: 80 MMHG | RESPIRATION RATE: 16 BRPM | HEART RATE: 94 BPM | HEIGHT: 64 IN | SYSTOLIC BLOOD PRESSURE: 130 MMHG

## 2024-07-31 DIAGNOSIS — R73.01 IMPAIRED FASTING GLUCOSE: ICD-10-CM

## 2024-07-31 DIAGNOSIS — Z00.00 WELL ADULT EXAM: Primary | Chronic | ICD-10-CM

## 2024-07-31 DIAGNOSIS — E78.5 HYPERLIPIDEMIA, UNSPECIFIED HYPERLIPIDEMIA TYPE: ICD-10-CM

## 2024-07-31 PROCEDURE — 99396 PREV VISIT EST AGE 40-64: CPT | Performed by: FAMILY MEDICINE

## 2024-07-31 RX ORDER — FLUCONAZOLE 150 MG/1
TABLET ORAL
COMMUNITY
Start: 2024-05-09 | End: 2024-07-31 | Stop reason: ALTCHOICE

## 2024-07-31 NOTE — PROGRESS NOTES
"Adult Annual Physical  Name: Simi Shah      : 1980      MRN: [id number]  Encounter Provider: Candace Sandoval MD  Encounter Date: 2024   Encounter department: [facility]    Assessment & Plan   1. Well adult exam  2. Hyperlipidemia, unspecified hyperlipidemia type  Assessment & Plan:  Counseled on lifestyle changes including diet/exercise. Repeat labs in 3 months.   Orders:  -     Lipid Panel with Direct LDL reflex; Future; Expected date: 10/14/2024  -     Comprehensive metabolic panel; Future; Expected date: 10/14/2024  3. Impaired fasting glucose  Assessment & Plan:  A1c has increased to 6.5. Counseled on lifestyle changes. Will repeat labs 3 in months.   Orders:  -     Hemoglobin A1C; Future; Expected date: 10/14/2024    Immunizations and preventive care screenings were discussed with patient today. Appropriate education was printed on patient's after visit summary.         History of Present Illness     Adult Annual Physical:  Patient presents for annual physical.     Diet and Physical Activity:  - Diet/Nutrition: poor diet.  - Exercise: walking.    Depression Screening:  - PHQ-2 Score: 0    General Health:  - Sleep: sleeps well.  - Hearing: normal hearing bilateral ears.  - Vision: no vision problems.  - Dental: regular dental visits.    /GYN Health:  - Follows with GYN: yes.     Review of Systems   Constitutional: Negative.    HENT: Negative.     Eyes: Negative.    Respiratory: Negative.     Cardiovascular: Negative.    Gastrointestinal: Negative.    Endocrine: Negative.    Genitourinary: Negative.    Musculoskeletal: Negative.    Skin: Negative.    Allergic/Immunologic: Negative.    Neurological: Negative.    Hematological: Negative.    Psychiatric/Behavioral: Negative.           Objective     /80   Pulse 94   Temp 97.6 °F (36.4 °C)   Resp 16   Ht 5' 4\" (1.626 m)   Wt 74.8 kg (165 lb)   BMI 28.32 kg/m²     Physical Exam  Vitals and nursing note reviewed. "   Constitutional:       General: She is not in acute distress.     Appearance: She is well-developed.   HENT:      Head: Normocephalic and atraumatic.      Right Ear: Tympanic membrane, ear canal and external ear normal. There is no impacted cerumen.      Left Ear: Tympanic membrane, ear canal and external ear normal. There is no impacted cerumen.      Nose: Nose normal.      Mouth/Throat:      Mouth: Mucous membranes are moist.   Eyes:      Conjunctiva/sclera: Conjunctivae normal.   Cardiovascular:      Rate and Rhythm: Normal rate and regular rhythm.      Heart sounds: No murmur heard.  Pulmonary:      Effort: Pulmonary effort is normal. No respiratory distress.      Breath sounds: Normal breath sounds.   Abdominal:      General: Abdomen is flat. There is no distension.      Palpations: Abdomen is soft. There is no mass.      Tenderness: There is no abdominal tenderness. There is no guarding or rebound.      Hernia: No hernia is present.   Musculoskeletal:         General: Normal range of motion.      Cervical back: Neck supple.   Skin:     General: Skin is warm and dry.   Neurological:      General: No focal deficit present.      Mental Status: She is alert and oriented to person, place, and time.

## 2024-07-31 NOTE — PROGRESS NOTES
"Ambulatory Visit  Name: Simi Shah      : 1980      MRN: [id number]  Encounter Provider: Candace Sandoval MD  Encounter Date: 2024   Encounter department: [facility]    Assessment & Plan   {There are no diagnoses linked to this encounter. (Refresh or delete this SmartLink)}     History of Present Illness   {Disappearing Hyperlinks I Encounters * My Last Note * Since Last Visit * History :78754}  HPI    Review of Systems  {Select to Display PMH (Optional):03533}  Objective   {Disappearing Hyperlinks   Review Vitals * Enter New Vitals * Results Review * Labs * Imaging * Cardiology * Procedures * Lung Cancer Screening :54220}  /80   Pulse 94   Temp 97.6 °F (36.4 °C)   Resp 16   Ht 5' 4\" (1.626 m)   Wt 74.8 kg (165 lb)   BMI 28.32 kg/m²     Physical Exam  Administrative Statements {Disappearing Hyperlinks I  Level of Service * PCMH/PCSP:61258}  {Time Spent Statement (Optional):18421}  "

## 2024-08-16 ENCOUNTER — HOSPITAL ENCOUNTER (OUTPATIENT)
Dept: RADIOLOGY | Facility: HOSPITAL | Age: 44
Discharge: HOME | End: 2024-08-16
Payer: COMMERCIAL

## 2024-08-16 VITALS — WEIGHT: 165 LBS | HEIGHT: 64 IN | BODY MASS INDEX: 28.17 KG/M2

## 2024-08-16 DIAGNOSIS — Z12.31 SCREENING MAMMOGRAM FOR BREAST CANCER: ICD-10-CM

## 2024-08-16 PROCEDURE — 77063 BREAST TOMOSYNTHESIS BI: CPT

## 2024-08-16 PROCEDURE — 77067 SCR MAMMO BI INCL CAD: CPT

## 2024-08-23 ENCOUNTER — ANNUAL EXAM (OUTPATIENT)
Dept: GYNECOLOGY | Facility: CLINIC | Age: 44
End: 2024-08-23
Payer: COMMERCIAL

## 2024-08-23 VITALS
BODY MASS INDEX: 28 KG/M2 | WEIGHT: 164 LBS | HEIGHT: 64 IN | SYSTOLIC BLOOD PRESSURE: 140 MMHG | DIASTOLIC BLOOD PRESSURE: 80 MMHG

## 2024-08-23 DIAGNOSIS — Z12.39 ENCOUNTER FOR SCREENING BREAST EXAMINATION: ICD-10-CM

## 2024-08-23 DIAGNOSIS — Z01.419 ENCOUNTER FOR WELL WOMAN EXAM: Primary | ICD-10-CM

## 2024-08-23 DIAGNOSIS — B00.9 HSV INFECTION: ICD-10-CM

## 2024-08-23 DIAGNOSIS — Z12.31 SCREENING MAMMOGRAM FOR BREAST CANCER: ICD-10-CM

## 2024-08-23 DIAGNOSIS — Z90.710 H/O: HYSTERECTOMY: ICD-10-CM

## 2024-08-23 PROCEDURE — S0612 ANNUAL GYNECOLOGICAL EXAMINA: HCPCS | Performed by: PHYSICIAN ASSISTANT

## 2024-08-23 RX ORDER — VALACYCLOVIR HYDROCHLORIDE 500 MG/1
500 TABLET, FILM COATED ORAL 2 TIMES DAILY
Qty: 10 TABLET | Refills: 1 | Status: SHIPPED | OUTPATIENT
Start: 2024-08-23 | End: 2024-08-28

## 2024-08-27 NOTE — PROGRESS NOTES
Assessment/Plan:      Diagnoses and all orders for this visit:    Encounter for well woman exam    Encounter for screening breast examination    H/O: hysterectomy    HSV infection  -     valACYclovir (VALTREX) 500 mg tablet; Take 1 tablet (500 mg total) by mouth 2 (two) times a day for 5 days    Screening mammogram for breast cancer  -     Mammo screening bilateral w 3d & cad; Future          Subjective:     Patient ID: Simi Shah is a 44 y.o. female.    Pt presents for her annual exam today--  She has no major gyn complaints  Slight irritation, ? Sores x few days  H/o hsv but infrequent outbreaks  She has no bleeding or pelvic pain--hyster  Bowel and bladder are regular  Colonoscopy--  No breast concerns today  Last mammo--24    No pap today.    Rx mamm  Rx valtrex 500mg bid x 5 days prn  Daily mvi        Review of Systems   Constitutional:  Negative for chills, fever and unexpected weight change.   HENT:  Negative for ear pain and sore throat.    Eyes:  Negative for pain and visual disturbance.   Respiratory:  Negative for cough and shortness of breath.    Cardiovascular:  Negative for chest pain and palpitations.   Gastrointestinal:  Negative for abdominal pain, blood in stool, constipation, diarrhea and vomiting.   Genitourinary: Negative.  Negative for dysuria and hematuria.   Musculoskeletal:  Negative for arthralgias and back pain.   Skin:  Negative for color change and rash.   Neurological:  Negative for seizures and syncope.   All other systems reviewed and are negative.        Objective:     Physical Exam  Vitals and nursing note reviewed.   Constitutional:       Appearance: Normal appearance. She is well-developed.   HENT:      Head: Normocephalic and atraumatic.   Chest:   Breasts:     Right: No inverted nipple, mass, nipple discharge or skin change.      Left: No inverted nipple, mass, nipple discharge or skin change.   Abdominal:      Palpations: Abdomen is soft.   Genitourinary:     Exam  position: Supine.      Labia:         Right: No rash, tenderness or lesion.         Left: No rash, tenderness or lesion.       Vagina: Normal.      Cervix: No cervical motion tenderness, discharge or friability.      Uterus: Absent.       Adnexa:         Right: No mass, tenderness or fullness.          Left: No mass, tenderness or fullness.     Musculoskeletal:      Cervical back: Normal range of motion.   Lymphadenopathy:      Lower Body: No right inguinal adenopathy. No left inguinal adenopathy.   Neurological:      Mental Status: She is alert.

## 2024-09-13 ENCOUNTER — ANESTHESIA EVENT (OUTPATIENT)
Dept: ANESTHESIOLOGY | Facility: HOSPITAL | Age: 44
End: 2024-09-13

## 2024-09-13 ENCOUNTER — ANESTHESIA (OUTPATIENT)
Dept: ANESTHESIOLOGY | Facility: HOSPITAL | Age: 44
End: 2024-09-13

## 2024-09-27 ENCOUNTER — ANESTHESIA EVENT (OUTPATIENT)
Dept: GASTROENTEROLOGY | Facility: AMBULARY SURGERY CENTER | Age: 44
End: 2024-09-27
Payer: COMMERCIAL

## 2024-09-27 ENCOUNTER — HOSPITAL ENCOUNTER (OUTPATIENT)
Dept: GASTROENTEROLOGY | Facility: AMBULARY SURGERY CENTER | Age: 44
Setting detail: OUTPATIENT SURGERY
End: 2024-09-27
Attending: INTERNAL MEDICINE
Payer: COMMERCIAL

## 2024-09-27 VITALS
WEIGHT: 160.2 LBS | SYSTOLIC BLOOD PRESSURE: 110 MMHG | DIASTOLIC BLOOD PRESSURE: 78 MMHG | HEIGHT: 64 IN | BODY MASS INDEX: 27.35 KG/M2 | RESPIRATION RATE: 16 BRPM | HEART RATE: 80 BPM | TEMPERATURE: 98.5 F | OXYGEN SATURATION: 97 %

## 2024-09-27 DIAGNOSIS — Z12.11 SCREENING FOR COLON CANCER: ICD-10-CM

## 2024-09-27 PROCEDURE — G0121 COLON CA SCRN NOT HI RSK IND: HCPCS | Performed by: INTERNAL MEDICINE

## 2024-09-27 RX ORDER — SODIUM CHLORIDE, SODIUM LACTATE, POTASSIUM CHLORIDE, CALCIUM CHLORIDE 600; 310; 30; 20 MG/100ML; MG/100ML; MG/100ML; MG/100ML
INJECTION, SOLUTION INTRAVENOUS CONTINUOUS PRN
Status: DISCONTINUED | OUTPATIENT
Start: 2024-09-27 | End: 2024-09-27

## 2024-09-27 RX ORDER — PROPOFOL 10 MG/ML
INJECTION, EMULSION INTRAVENOUS AS NEEDED
Status: DISCONTINUED | OUTPATIENT
Start: 2024-09-27 | End: 2024-09-27

## 2024-09-27 RX ADMIN — Medication 40 MG: at 11:34

## 2024-09-27 RX ADMIN — PROPOFOL 50 MG: 10 INJECTION, EMULSION INTRAVENOUS at 11:15

## 2024-09-27 RX ADMIN — PROPOFOL 50 MG: 10 INJECTION, EMULSION INTRAVENOUS at 11:27

## 2024-09-27 RX ADMIN — PROPOFOL 50 MG: 10 INJECTION, EMULSION INTRAVENOUS at 11:20

## 2024-09-27 RX ADMIN — PROPOFOL 50 MG: 10 INJECTION, EMULSION INTRAVENOUS at 11:12

## 2024-09-27 RX ADMIN — PROPOFOL 50 MG: 10 INJECTION, EMULSION INTRAVENOUS at 11:35

## 2024-09-27 RX ADMIN — PROPOFOL 110 MG: 10 INJECTION, EMULSION INTRAVENOUS at 11:09

## 2024-09-27 RX ADMIN — PROPOFOL 50 MG: 10 INJECTION, EMULSION INTRAVENOUS at 11:18

## 2024-09-27 RX ADMIN — PROPOFOL 90 MG: 10 INJECTION, EMULSION INTRAVENOUS at 11:22

## 2024-09-27 RX ADMIN — SODIUM CHLORIDE, SODIUM LACTATE, POTASSIUM CHLORIDE, AND CALCIUM CHLORIDE: .6; .31; .03; .02 INJECTION, SOLUTION INTRAVENOUS at 11:07

## 2024-09-27 RX ADMIN — PROPOFOL 50 MG: 10 INJECTION, EMULSION INTRAVENOUS at 11:32

## 2024-09-27 NOTE — ANESTHESIA PREPROCEDURE EVALUATION
Procedure:  COLONOSCOPY    Relevant Problems   ANESTHESIA (within normal limits)      CARDIO   (+) Hyperlipidemia      MUSCULOSKELETAL (within normal limits)      PULMONARY (within normal limits)        Physical Exam    Airway    Mallampati score: II         Dental   No notable dental hx     Cardiovascular  Cardiovascular exam normal    Pulmonary  Pulmonary exam normal     Other Findings  post-pubertal.      Anesthesia Plan  ASA Score- 2     Anesthesia Type- IV sedation with anesthesia with ASA Monitors.         Additional Monitors:     Airway Plan:            Plan Factors-Exercise tolerance (METS): >4 METS.    Chart reviewed.    Patient summary reviewed.    Patient is not a current smoker.  Patient did not smoke on day of surgery.            Induction- intravenous.    Postoperative Plan-     Perioperative Resuscitation Plan - Level 1 - Full Code.       Informed Consent- Anesthetic plan and risks discussed with patient.  I personally reviewed this patient with the CRNA. Discussed and agreed on the Anesthesia Plan with the CRNA..

## 2024-09-27 NOTE — H&P
"History and Physical -  [facility]  Simi Shah 44 y.o. female MRN: [id number]    HPI: Simi Shah is a 44 y.o. year old female who presents for colon cancer screening.      Review of Systems    Historical Information   Past Medical History:   Diagnosis Date    Anemia     BRCA1 negative     Ear problems     Headache(784.0)     HSV (herpes simplex virus) infection     Hyperlipidemia     Hypertension      Past Surgical History:   Procedure Laterality Date    BREAST BIOPSY Right 2010     SECTION      HYSTERECTOMY      IR CEREBRAL ANGIOGRAPHY  2023     Social History   Social History     Substance and Sexual Activity   Alcohol Use Not Currently     Social History     Substance and Sexual Activity   Drug Use Never     Social History     Tobacco Use   Smoking Status Never   Smokeless Tobacco Never     Family History   Problem Relation Age of Onset    Diabetes Mother     Cancer Mother     Hypertension Mother     Migraines Mother     Diabetes Father     Hypertension Father     Breast cancer Sister     Hypertension Maternal Grandmother     Cancer Maternal Grandfather     Diabetes Paternal Grandmother     Diabetes Paternal Grandfather     Diabetes Brother     Breast cancer Maternal Aunt     Aneurysm Maternal Aunt        Meds/Allergies     Not in a hospital admission.    Allergies   Allergen Reactions    Penicillin G Hives       Objective     /75   Pulse 98   Temp (!) 96.9 °F (36.1 °C) (Temporal)   Resp 18   Ht 5' 4\" (1.626 m)   Wt 72.7 kg (160 lb 3.2 oz)   SpO2 100%   BMI 27.50 kg/m²       PHYSICAL EXAM    Gen: NAD  CV: RRR  CHEST: Clear  ABD: soft, NT/ND  EXT: no edema  Neuro: AAO      ASSESSMENT/PLAN:  This is a 44 y.o. year old female here for colon cancer screening evaluation.  Patient was explained about the risks and benefits of the procedure. Risks including but not limited to bleeding, infection, perforation were explained in detail. Also explained about less than " 100% sensitivity with the exam and other alternatives.    PLAN:   Procedure: Colonoscopy.

## 2024-09-27 NOTE — ANESTHESIA POSTPROCEDURE EVALUATION
Post-Op Assessment Note    CV Status:  Stable  Pain Score: 0    Pain management: adequate       Mental Status:  Awake and alert   Hydration Status:  Stable   PONV Controlled:  None   Airway Patency:  Patent and adequate     Post Op Vitals Reviewed: Yes    No anethesia notable event occurred.    Staff: CRNA, Anesthesiologist               BP   102/56   Temp      Pulse  89   Resp   16   SpO2   100%

## 2024-11-26 ENCOUNTER — IMMUNIZATIONS (OUTPATIENT)
Dept: FAMILY MEDICINE CLINIC | Facility: CLINIC | Age: 44
End: 2024-11-26
Payer: COMMERCIAL

## 2024-11-26 DIAGNOSIS — Z23 ENCOUNTER FOR IMMUNIZATION: Primary | ICD-10-CM

## 2024-11-26 PROCEDURE — 90471 IMMUNIZATION ADMIN: CPT

## 2024-11-26 PROCEDURE — 90656 IIV3 VACC NO PRSV 0.5 ML IM: CPT

## 2025-01-23 ENCOUNTER — NURSE TRIAGE (OUTPATIENT)
Age: 45
End: 2025-01-23

## 2025-01-23 NOTE — TELEPHONE ENCOUNTER
"Pt is c/o having vaginal irritation and watery like discharge, no odor.  Pt reporting having a rash, but pt denies seeing \"anything\".  Pt denies , fever, itching, vaginal bleeding, pain with urination, injury to genital area. Pt has occasional burning with urination, but pt doesn't currently have that right now.     Pt is advised to be seen in the office, pt would like an appt for 1/24. Pt is now scheduled for 1/24/25.         Reason for Disposition   Patient wants to be seen    Answer Assessment - Initial Assessment Questions  1. DISCHARGE: \"Describe the discharge.\" (e.g., white, yellow, green, gray, foamy, cottage cheese-like)      Watery discharge   2. ODOR: \"Is there a bad odor?\"      Pt denies   3. ONSET: \"When did the discharge begin?\"      2 weeks   4. RASH: \"Is there a rash in the genital area?\" If Yes, ask: \"Describe it.\" (e.g., redness, blisters, sores, bumps)      Pt reporting having a rash, but pt denies seeing \"anything\".   5. ABDOMEN PAIN: \"Are you having any abdomen pain?\" If Yes, ask: \"What does it feel like? \" (e.g., crampy, dull, intermittent, constant)       Pt denies   6. ABDOMEN PAIN SEVERITY: If present, ask: \"How bad is it?\" (e.g., Scale 1-10; mild, moderate, or severe)      Pt denies   7. CAUSE: \"What do you think is causing the discharge?\" \"Have you had the same problem before?\" \"What happened then?\"      Pt unsure   8. OTHER SYMPTOMS: \"Do you have any other symptoms?\" (e.g., fever, itching, vaginal bleeding, pain with urination, injury to genital area)      Pt denies , fever, itching, vaginal bleeding, pain with urination, injury to genital area  9. PREGNANCY: \"Is there any chance you are pregnant?\" \"When was your last menstrual period?\"      Pt had hysterectomy    Protocols used: Vaginal Discharge-Adult-[place]    "

## 2025-01-23 NOTE — TELEPHONE ENCOUNTER
Regarding: concerns for yeast infection  ----- Message from Elaine HERRMANN sent at 1/23/2025 10:12 AM EST -----  Patient calling with vaginal irritation x2 weeks concern for yeast infection.    Tried CTS  Bacon Contreras [place]

## 2025-01-24 ENCOUNTER — OFFICE VISIT (OUTPATIENT)
Dept: OBGYN CLINIC | Facility: CLINIC | Age: 45
End: 2025-01-24
Payer: COMMERCIAL

## 2025-01-24 VITALS
HEIGHT: 64 IN | WEIGHT: 169 LBS | SYSTOLIC BLOOD PRESSURE: 152 MMHG | DIASTOLIC BLOOD PRESSURE: 96 MMHG | BODY MASS INDEX: 28.85 KG/M2

## 2025-01-24 DIAGNOSIS — N76.0 ACUTE VAGINITIS: Primary | ICD-10-CM

## 2025-01-24 DIAGNOSIS — B37.31 YEAST VAGINITIS: ICD-10-CM

## 2025-01-24 PROCEDURE — 81514 NFCT DS BV&VAGINITIS DNA ALG: CPT | Performed by: STUDENT IN AN ORGANIZED HEALTH CARE EDUCATION/TRAINING PROGRAM

## 2025-01-24 PROCEDURE — 87086 URINE CULTURE/COLONY COUNT: CPT | Performed by: STUDENT IN AN ORGANIZED HEALTH CARE EDUCATION/TRAINING PROGRAM

## 2025-01-24 PROCEDURE — 99214 OFFICE O/P EST MOD 30 MIN: CPT | Performed by: STUDENT IN AN ORGANIZED HEALTH CARE EDUCATION/TRAINING PROGRAM

## 2025-01-24 PROCEDURE — 87491 CHLMYD TRACH DNA AMP PROBE: CPT | Performed by: STUDENT IN AN ORGANIZED HEALTH CARE EDUCATION/TRAINING PROGRAM

## 2025-01-24 PROCEDURE — 87591 N.GONORRHOEAE DNA AMP PROB: CPT | Performed by: STUDENT IN AN ORGANIZED HEALTH CARE EDUCATION/TRAINING PROGRAM

## 2025-01-24 RX ORDER — NYSTATIN AND TRIAMCINOLONE ACETONIDE 100000; 1 [USP'U]/G; MG/G
OINTMENT TOPICAL 2 TIMES DAILY
Qty: 30 G | Refills: 0 | Status: SHIPPED | OUTPATIENT
Start: 2025-01-24

## 2025-01-24 RX ORDER — FLUCONAZOLE 150 MG/1
150 TABLET ORAL
Qty: 2 TABLET | Refills: 0 | Status: SHIPPED | OUTPATIENT
Start: 2025-01-24 | End: 2025-01-28

## 2025-01-24 NOTE — PROGRESS NOTES
"Name: Simi Shah      : 1980      MRN: [id number]  Encounter Provider: Angelica Deng MD  Encounter Date: 2025   Encounter department: [facility]  :  Assessment & Plan  Acute vaginitis  44-year-old G1, P1 status post hysterectomy for fibroid uterus presenting with acute vaginitis symptoms that have lasted for 2 weeks-exam today consistent with small ulcerations on the labia that can be consistent with her prior HSV so I recommended she initiate her Valtrex on she understands that this is less effective if started within the first 48 to 72 hours.  Will  treat empirically for yeast given findings on exam today consistent with thick white discharge and symptoms-Diflucan sent to pharmacy as well as Mycolog to help with vulvar irritation.  Cultures collected today including molecular panel and STD testing.  Vulvovaginal health and hygiene reviewed.    Orders:    nystatin-triamcinolone (MYCOLOG-II) ointment; Apply topically 2 (two) times a day    Yeast vaginitis    Orders:    fluconazole (DIFLUCAN) 150 mg tablet; Take 1 tablet (150 mg total) by mouth every third day for 2 doses        History of Present Illness   HPI  Simi Shah is a 44 y.o. female who presents with vaginal and vulvar irritation for about 2 weeks-she reports associated discharge that is mainly clear, mild odor and occasional vaginal pruritus.  She also notes vulvar burning and dysuria when urine touching the vulva.  She reports this does feel different from prior yeast infections which she has had in the past.  History also significant for prior HSV.  She is trialed flaxseed oil.  She does currently have 1 sexual partner and does not use condoms.  Has history of hysterectomy for the indication of fibroid uterus.      Review of Systems  Per HPI      Objective   /96 (BP Location: Left arm, Patient Position: Sitting, Cuff Size: Standard)   Ht 5' 4\" (1.626 m)   Wt 76.7 kg (169 lb)   BMI 29.01 " kg/m²      Physical Exam  Vitals reviewed.   Constitutional:       General: She is not in acute distress.     Appearance: Normal appearance. She is not ill-appearing or diaphoretic.   HENT:      Head: Normocephalic and atraumatic.   Cardiovascular:      Rate and Rhythm: Normal rate.   Pulmonary:      Effort: Pulmonary effort is normal. No respiratory distress.   Abdominal:      Palpations: Abdomen is soft.      Tenderness: There is no abdominal tenderness. There is no guarding or rebound.   Genitourinary:     Comments: Vulvar erythema noted particularly near the clitoral and upper labia minora region-small shallow-based ulcerations noted.  On speculum examination the vagina appeared grossly normal there was thick white/yellow discharge noted.  No ulcerations within the vagina.  Musculoskeletal:      Right lower leg: No edema.      Left lower leg: No edema.   Skin:     General: Skin is warm and dry.   Neurological:      Mental Status: She is alert and oriented to person, place, and time.   Psychiatric:         Mood and Affect: Mood normal.         Behavior: Behavior normal.

## 2025-01-25 LAB
C GLABRATA DNA VAG QL NAA+PROBE: NEGATIVE
C KRUSEI DNA VAG QL NAA+PROBE: NEGATIVE
CANDIDA SP 6 PNL VAG NAA+PROBE: POSITIVE
T VAGINALIS DNA VAG QL NAA+PROBE: NEGATIVE
VAGINOSIS/ITIS DNA PNL VAG PROBE+SIG AMP: NEGATIVE

## 2025-01-26 ENCOUNTER — RESULTS FOLLOW-UP (OUTPATIENT)
Dept: OBGYN CLINIC | Facility: CLINIC | Age: 45
End: 2025-01-26

## 2025-01-26 LAB — BACTERIA UR CULT: NORMAL

## 2025-01-27 LAB
C TRACH DNA SPEC QL NAA+PROBE: NEGATIVE
N GONORRHOEA DNA SPEC QL NAA+PROBE: NEGATIVE

## 2025-04-30 DIAGNOSIS — B00.9 HSV INFECTION: ICD-10-CM

## 2025-05-03 RX ORDER — VALACYCLOVIR HYDROCHLORIDE 500 MG/1
500 TABLET, FILM COATED ORAL 2 TIMES DAILY
Qty: 10 TABLET | Refills: 0 | Status: SHIPPED | OUTPATIENT
Start: 2025-05-03 | End: 2025-05-08

## 2025-06-06 ENCOUNTER — ESTABLISHED COMPREHENSIVE EXAM (OUTPATIENT)
Dept: URBAN - METROPOLITAN AREA CLINIC 6 | Facility: CLINIC | Age: 45
End: 2025-06-06

## 2025-06-06 DIAGNOSIS — Z01.00: ICD-10-CM

## 2025-06-06 PROCEDURE — 92014 COMPRE OPH EXAM EST PT 1/>: CPT

## 2025-06-06 ASSESSMENT — TONOMETRY
OS_IOP_MMHG: 18
OD_IOP_MMHG: 20

## 2025-06-06 ASSESSMENT — VISUAL ACUITY
OS_SC: 20/20-2
OD_SC: 20/20-2

## 2025-06-06 ASSESSMENT — KERATOMETRY
OS_K2POWER_DIOPTERS: 42.25
OD_AXISANGLE2_DEGREES: 18
OD_K1POWER_DIOPTERS: 42.25
OS_AXISANGLE2_DEGREES: 147
OD_K2POWER_DIOPTERS: 42.50
OS_AXISANGLE_DEGREES: 57
OD_AXISANGLE_DEGREES: 108
OS_K1POWER_DIOPTERS: 41.75

## 2025-07-11 DIAGNOSIS — B00.9 HSV INFECTION: ICD-10-CM

## 2025-07-11 RX ORDER — VALACYCLOVIR HYDROCHLORIDE 500 MG/1
500 TABLET, FILM COATED ORAL 2 TIMES DAILY
Qty: 10 TABLET | Refills: 0 | Status: SHIPPED | OUTPATIENT
Start: 2025-07-11 | End: 2025-07-16

## 2025-07-26 ENCOUNTER — APPOINTMENT (OUTPATIENT)
Dept: LAB | Facility: HOSPITAL | Age: 45
End: 2025-07-26
Attending: PREVENTIVE MEDICINE
Payer: COMMERCIAL

## 2025-07-26 ENCOUNTER — APPOINTMENT (OUTPATIENT)
Dept: LAB | Facility: HOSPITAL | Age: 45
End: 2025-07-26
Attending: FAMILY MEDICINE
Payer: COMMERCIAL

## 2025-07-26 DIAGNOSIS — R73.01 IMPAIRED FASTING GLUCOSE: ICD-10-CM

## 2025-07-26 DIAGNOSIS — E78.5 HYPERLIPIDEMIA, UNSPECIFIED HYPERLIPIDEMIA TYPE: ICD-10-CM

## 2025-07-26 LAB
ALBUMIN SERPL BCG-MCNC: 4.4 G/DL (ref 3.5–5)
ALP SERPL-CCNC: 53 U/L (ref 34–104)
ALT SERPL W P-5'-P-CCNC: 40 U/L (ref 7–52)
ANION GAP SERPL CALCULATED.3IONS-SCNC: 8 MMOL/L (ref 4–13)
AST SERPL W P-5'-P-CCNC: 19 U/L (ref 13–39)
BILIRUB SERPL-MCNC: 0.35 MG/DL (ref 0.2–1)
BUN SERPL-MCNC: 13 MG/DL (ref 5–25)
CALCIUM SERPL-MCNC: 9.7 MG/DL (ref 8.4–10.2)
CHLORIDE SERPL-SCNC: 101 MMOL/L (ref 96–108)
CHOLEST SERPL-MCNC: 231 MG/DL (ref ?–200)
CO2 SERPL-SCNC: 28 MMOL/L (ref 21–32)
CREAT SERPL-MCNC: 0.83 MG/DL (ref 0.6–1.3)
GFR SERPL CREATININE-BSD FRML MDRD: 84 ML/MIN/1.73SQ M
GLUCOSE P FAST SERPL-MCNC: 102 MG/DL (ref 65–99)
HDLC SERPL-MCNC: 63 MG/DL
LDLC SERPL CALC-MCNC: 137 MG/DL (ref 0–100)
POTASSIUM SERPL-SCNC: 4.1 MMOL/L (ref 3.5–5.3)
PROT SERPL-MCNC: 7.6 G/DL (ref 6.4–8.4)
SODIUM SERPL-SCNC: 137 MMOL/L (ref 135–147)
TRIGL SERPL-MCNC: 154 MG/DL (ref ?–150)

## 2025-07-26 PROCEDURE — 80061 LIPID PANEL: CPT

## 2025-07-26 PROCEDURE — 80053 COMPREHEN METABOLIC PANEL: CPT

## 2025-07-26 PROCEDURE — 36415 COLL VENOUS BLD VENIPUNCTURE: CPT

## 2025-08-06 ENCOUNTER — OFFICE VISIT (OUTPATIENT)
Dept: FAMILY MEDICINE CLINIC | Facility: CLINIC | Age: 45
End: 2025-08-06
Payer: COMMERCIAL

## 2025-08-06 VITALS
HEART RATE: 83 BPM | TEMPERATURE: 97.6 F | BODY MASS INDEX: 28.85 KG/M2 | WEIGHT: 169 LBS | DIASTOLIC BLOOD PRESSURE: 80 MMHG | SYSTOLIC BLOOD PRESSURE: 122 MMHG | HEIGHT: 64 IN | RESPIRATION RATE: 16 BRPM

## 2025-08-06 DIAGNOSIS — R73.01 IMPAIRED FASTING GLUCOSE: ICD-10-CM

## 2025-08-06 DIAGNOSIS — E78.5 HYPERLIPIDEMIA, UNSPECIFIED HYPERLIPIDEMIA TYPE: ICD-10-CM

## 2025-08-06 DIAGNOSIS — Z00.00 ANNUAL PHYSICAL EXAM: Primary | ICD-10-CM

## 2025-08-06 DIAGNOSIS — B00.9 HSV INFECTION: ICD-10-CM

## 2025-08-06 DIAGNOSIS — Z12.31 ENCOUNTER FOR SCREENING MAMMOGRAM FOR BREAST CANCER: ICD-10-CM

## 2025-08-06 DIAGNOSIS — B37.9 YEAST INFECTION: ICD-10-CM

## 2025-08-06 PROCEDURE — 99396 PREV VISIT EST AGE 40-64: CPT | Performed by: FAMILY MEDICINE

## 2025-08-06 RX ORDER — FLUCONAZOLE 150 MG/1
150 TABLET ORAL ONCE
Qty: 1 TABLET | Refills: 0 | Status: SHIPPED | OUTPATIENT
Start: 2025-08-06 | End: 2025-08-06

## 2025-08-06 RX ORDER — VALACYCLOVIR HYDROCHLORIDE 500 MG/1
500 TABLET, FILM COATED ORAL 2 TIMES DAILY
Qty: 10 TABLET | Refills: 0 | Status: SHIPPED | OUTPATIENT
Start: 2025-08-06 | End: 2025-08-11

## 2025-08-06 RX ORDER — FLUCONAZOLE 150 MG/1
TABLET ORAL
COMMUNITY
Start: 2025-07-25 | End: 2025-08-06 | Stop reason: SDUPTHER